# Patient Record
Sex: MALE | Race: OTHER | NOT HISPANIC OR LATINO
[De-identification: names, ages, dates, MRNs, and addresses within clinical notes are randomized per-mention and may not be internally consistent; named-entity substitution may affect disease eponyms.]

---

## 2017-05-30 ENCOUNTER — APPOINTMENT (OUTPATIENT)
Dept: INTERNAL MEDICINE | Facility: CLINIC | Age: 77
End: 2017-05-30

## 2017-06-22 ENCOUNTER — APPOINTMENT (OUTPATIENT)
Dept: INTERNAL MEDICINE | Facility: CLINIC | Age: 77
End: 2017-06-22

## 2017-07-31 ENCOUNTER — APPOINTMENT (OUTPATIENT)
Dept: ORTHOPEDIC SURGERY | Facility: CLINIC | Age: 77
End: 2017-07-31
Payer: MEDICARE

## 2017-07-31 VITALS
HEART RATE: 57 BPM | HEIGHT: 73 IN | DIASTOLIC BLOOD PRESSURE: 63 MMHG | SYSTOLIC BLOOD PRESSURE: 124 MMHG | WEIGHT: 204 LBS | BODY MASS INDEX: 27.04 KG/M2

## 2017-07-31 DIAGNOSIS — M18.0 BILATERAL PRIMARY OSTEOARTHRITIS OF FIRST CARPOMETACARPAL JOINTS: ICD-10-CM

## 2017-07-31 DIAGNOSIS — Z78.9 OTHER SPECIFIED HEALTH STATUS: ICD-10-CM

## 2017-07-31 DIAGNOSIS — M75.51 BURSITIS OF RIGHT SHOULDER: ICD-10-CM

## 2017-07-31 DIAGNOSIS — F17.200 NICOTINE DEPENDENCE, UNSPECIFIED, UNCOMPLICATED: ICD-10-CM

## 2017-07-31 DIAGNOSIS — Z86.39 PERSONAL HISTORY OF OTHER ENDOCRINE, NUTRITIONAL AND METABOLIC DISEASE: ICD-10-CM

## 2017-07-31 DIAGNOSIS — M25.511 PAIN IN RIGHT SHOULDER: ICD-10-CM

## 2017-07-31 PROCEDURE — 73030 X-RAY EXAM OF SHOULDER: CPT | Mod: RT

## 2017-07-31 PROCEDURE — 99203 OFFICE O/P NEW LOW 30 MIN: CPT

## 2018-07-19 ENCOUNTER — RECORD ABSTRACTING (OUTPATIENT)
Age: 78
End: 2018-07-19

## 2018-07-19 DIAGNOSIS — N40.0 BENIGN PROSTATIC HYPERPLASIA WITHOUT LOWER URINARY TRACT SYMPMS: ICD-10-CM

## 2018-07-19 DIAGNOSIS — Z86.39 PERSONAL HISTORY OF OTHER ENDOCRINE, NUTRITIONAL AND METABOLIC DISEASE: ICD-10-CM

## 2018-07-19 DIAGNOSIS — R97.20 BENIGN PROSTATIC HYPERPLASIA WITHOUT LOWER URINARY TRACT SYMPMS: ICD-10-CM

## 2018-07-19 DIAGNOSIS — N28.9 DISORDER OF KIDNEY AND URETER, UNSPECIFIED: ICD-10-CM

## 2018-07-19 DIAGNOSIS — Z82.49 FAMILY HISTORY OF ISCHEMIC HEART DISEASE AND OTHER DISEASES OF THE CIRCULATORY SYSTEM: ICD-10-CM

## 2018-08-07 ENCOUNTER — APPOINTMENT (OUTPATIENT)
Dept: INTERNAL MEDICINE | Facility: CLINIC | Age: 78
End: 2018-08-07
Payer: MEDICARE

## 2018-08-07 VITALS
HEIGHT: 73 IN | DIASTOLIC BLOOD PRESSURE: 59 MMHG | BODY MASS INDEX: 25.45 KG/M2 | SYSTOLIC BLOOD PRESSURE: 128 MMHG | WEIGHT: 192 LBS

## 2018-08-07 PROCEDURE — 99214 OFFICE O/P EST MOD 30 MIN: CPT | Mod: 25

## 2018-08-07 PROCEDURE — 36415 COLL VENOUS BLD VENIPUNCTURE: CPT

## 2018-08-07 RX ORDER — FUROSEMIDE 20 MG/1
20 TABLET ORAL
Qty: 90 | Refills: 0 | Status: DISCONTINUED | COMMUNITY
Start: 2017-07-18 | End: 2018-08-07

## 2018-08-07 NOTE — ASSESSMENT
[FreeTextEntry1] : BPH, to follow with Dr. Nice urology\par \par Chronic kidney disease, review less today. Patient is instructed to avoid nephrotoxic agent.\par \par Hyperlipidemia, continue atorvastatin.\par \par Stable Hypertension, patient currently on hydrochlorothiazide-triamterene, losartan

## 2018-08-07 NOTE — HISTORY OF PRESENT ILLNESS
[Spouse] : spouse [FreeTextEntry1] : follow up on meds [de-identified] : This 78-year-old man who has seen doctors Arnie June 21, 2018. At that time he was noted to have elevated creatinine and calcium levels. Per his note his workup was significant only for a large prostate. And he was following with urology for that.\par \par Patient is due for follow Dr. Nice, urology next week for his enlarged prostate. was detected at the prior Visit\par \par \par Patient has no new complaints.\par \par He feels generally well.

## 2018-08-07 NOTE — PHYSICAL EXAM

## 2018-08-10 LAB
25(OH)D3 SERPL-MCNC: 34.8 NG/ML
ALBUMIN SERPL ELPH-MCNC: 4.5 G/DL
ALP BLD-CCNC: 84 U/L
ALT SERPL-CCNC: 12 U/L
ANION GAP SERPL CALC-SCNC: 13 MMOL/L
AST SERPL-CCNC: 19 U/L
BASOPHILS # BLD AUTO: 0.02 K/UL
BASOPHILS NFR BLD AUTO: 0.2 %
BILIRUB SERPL-MCNC: 0.3 MG/DL
BUN SERPL-MCNC: 31 MG/DL
CALCIUM SERPL-MCNC: 11.6 MG/DL
CHLORIDE SERPL-SCNC: 100 MMOL/L
CHOLEST SERPL-MCNC: 142 MG/DL
CHOLEST/HDLC SERPL: 3.2 RATIO
CO2 SERPL-SCNC: 25 MMOL/L
CREAT SERPL-MCNC: 1.73 MG/DL
EOSINOPHIL # BLD AUTO: 0.17 K/UL
EOSINOPHIL NFR BLD AUTO: 1.9 %
GLUCOSE SERPL-MCNC: 86 MG/DL
HBA1C MFR BLD HPLC: 4.9 %
HCT VFR BLD CALC: 37.5 %
HDLC SERPL-MCNC: 45 MG/DL
HGB BLD-MCNC: 11.9 G/DL
IMM GRANULOCYTES NFR BLD AUTO: 0.2 %
LDLC SERPL CALC-MCNC: 71 MG/DL
LYMPHOCYTES # BLD AUTO: 2.06 K/UL
LYMPHOCYTES NFR BLD AUTO: 22.6 %
MAN DIFF?: NORMAL
MCHC RBC-ENTMCNC: 30.9 PG
MCHC RBC-ENTMCNC: 31.7 GM/DL
MCV RBC AUTO: 97.4 FL
MONOCYTES # BLD AUTO: 0.42 K/UL
MONOCYTES NFR BLD AUTO: 4.6 %
NEUTROPHILS # BLD AUTO: 6.42 K/UL
NEUTROPHILS NFR BLD AUTO: 70.5 %
PLATELET # BLD AUTO: 205 K/UL
POTASSIUM SERPL-SCNC: 4.7 MMOL/L
PROT SERPL-MCNC: 7.5 G/DL
PSA SERPL-MCNC: 7.13 NG/ML
RBC # BLD: 3.85 M/UL
RBC # FLD: 12.7 %
SODIUM SERPL-SCNC: 138 MMOL/L
TRIGL SERPL-MCNC: 132 MG/DL
WBC # FLD AUTO: 9.11 K/UL

## 2018-10-23 ENCOUNTER — LABORATORY RESULT (OUTPATIENT)
Age: 78
End: 2018-10-23

## 2019-02-07 ENCOUNTER — RX RENEWAL (OUTPATIENT)
Age: 79
End: 2019-02-07

## 2019-03-13 ENCOUNTER — RX RENEWAL (OUTPATIENT)
Age: 79
End: 2019-03-13

## 2019-03-28 ENCOUNTER — MEDICATION RENEWAL (OUTPATIENT)
Age: 79
End: 2019-03-28

## 2019-05-14 ENCOUNTER — MEDICATION RENEWAL (OUTPATIENT)
Age: 79
End: 2019-05-14

## 2019-07-12 ENCOUNTER — LABORATORY RESULT (OUTPATIENT)
Age: 79
End: 2019-07-12

## 2019-07-12 ENCOUNTER — APPOINTMENT (OUTPATIENT)
Dept: INTERNAL MEDICINE | Facility: CLINIC | Age: 79
End: 2019-07-12
Payer: MEDICARE

## 2019-07-12 ENCOUNTER — NON-APPOINTMENT (OUTPATIENT)
Age: 79
End: 2019-07-12

## 2019-07-12 VITALS
DIASTOLIC BLOOD PRESSURE: 80 MMHG | HEIGHT: 73 IN | TEMPERATURE: 98.2 F | WEIGHT: 197.5 LBS | BODY MASS INDEX: 26.17 KG/M2 | OXYGEN SATURATION: 98 % | HEART RATE: 64 BPM | SYSTOLIC BLOOD PRESSURE: 132 MMHG

## 2019-07-12 PROCEDURE — G0444 DEPRESSION SCREEN ANNUAL: CPT | Mod: 59

## 2019-07-12 PROCEDURE — 36415 COLL VENOUS BLD VENIPUNCTURE: CPT

## 2019-07-12 PROCEDURE — 99406 BEHAV CHNG SMOKING 3-10 MIN: CPT

## 2019-07-12 PROCEDURE — 99497 ADVNCD CARE PLAN 30 MIN: CPT | Mod: 25

## 2019-07-12 PROCEDURE — 93000 ELECTROCARDIOGRAM COMPLETE: CPT

## 2019-07-12 PROCEDURE — 99214 OFFICE O/P EST MOD 30 MIN: CPT | Mod: 25

## 2019-07-12 PROCEDURE — G0439: CPT

## 2019-07-12 RX ORDER — ASPIRIN ENTERIC COATED TABLETS 81 MG 81 MG/1
81 TABLET, DELAYED RELEASE ORAL DAILY
Refills: 0 | Status: DISCONTINUED | COMMUNITY
End: 2019-07-12

## 2019-07-12 NOTE — HEALTH RISK ASSESSMENT
[Good] : ~his/her~  mood as  good [] : Yes [6-10] : 6-10 [No] : No [0] : 2) Feeling down, depressed, or hopeless: Not at all (0) [Patient declined Low Dose CT Scan] : Patient declined Low Dose CT Scan [Patient declined Retinal Exam] : Patient declined Retinal Exam. [Patient declined mammogram] : Patient declined mammogram [Patient declined PAP Smear] : Patient declined PAP Smear [Patient declined bone density test] : Patient declined bone density test [Patient declined colonoscopy] : Patient declined colonoscopy [HIV test declined] : HIV test declined [Hepatitis C test declined] : Hepatitis C test declined [Reviewed updated] : Reviewed updated [Designated Healthcare Proxy] : Designated healthcare proxy [Name: ___] : Health Care Proxy's Name: [unfilled]  [Relationship: ___] : Relationship: [unfilled] [Aggressive treatment] : aggressive treatment [[id number]] : 0 [de-identified] : Urologist [AdvancecareDate] : 07/19

## 2019-07-12 NOTE — HISTORY OF PRESENT ILLNESS
[FreeTextEntry1] : physical [de-identified] : Mr. DESIRAE OAKLEY is a 79 year male with a PMH of HTN, HLD, comes to the office for physical exam. Patient feels well and has no complaints at this time.

## 2019-07-12 NOTE — PLAN
[FreeTextEntry1] : In regards to patients Physical exam, routine blood work drawn, will review results with patient. EKG reviewed in office \par \par In regards to patients ankle pain, patient was referred to Podiatrist Dr. Cheung.\par \par patient's BP well controlled with current medication. will continue current  regimen \par \par Counseling included abnormal lab results, differential diagnoses, treatment options, risks and benefits, lifestyle changes, prognosis, current condition, medications, and dose adjustments. \par The patient was interactive, attentive, asked questions, and verbalized understanding

## 2019-07-12 NOTE — PHYSICAL EXAM
[No Acute Distress] : no acute distress [Well Nourished] : well nourished [Well-Appearing] : well-appearing [Well Developed] : well developed [Normal Sclera/Conjunctiva] : normal sclera/conjunctiva [EOMI] : extraocular movements intact [PERRL] : pupils equal round and reactive to light [Normal Outer Ear/Nose] : the outer ears and nose were normal in appearance [Normal Oropharynx] : the oropharynx was normal [No JVD] : no jugular venous distention [No Lymphadenopathy] : no lymphadenopathy [Supple] : supple [Thyroid Normal, No Nodules] : the thyroid was normal and there were no nodules present [No Respiratory Distress] : no respiratory distress  [No Accessory Muscle Use] : no accessory muscle use [Clear to Auscultation] : lungs were clear to auscultation bilaterally [Normal Rate] : normal rate  [Regular Rhythm] : with a regular rhythm [Normal S1, S2] : normal S1 and S2 [No Murmur] : no murmur heard [No Carotid Bruits] : no carotid bruits [No Abdominal Bruit] : a ~M bruit was not heard ~T in the abdomen [Pedal Pulses Present] : the pedal pulses are present [No Varicosities] : no varicosities [No Palpable Aorta] : no palpable aorta [No Extremity Clubbing/Cyanosis] : no extremity clubbing/cyanosis [Soft] : abdomen soft [Non Tender] : non-tender [Non-distended] : non-distended [No Masses] : no abdominal mass palpated [No HSM] : no HSM [Normal Bowel Sounds] : normal bowel sounds [Normal Posterior Cervical Nodes] : no posterior cervical lymphadenopathy [Normal Anterior Cervical Nodes] : no anterior cervical lymphadenopathy [No CVA Tenderness] : no CVA  tenderness [No Spinal Tenderness] : no spinal tenderness [No Joint Swelling] : no joint swelling [No Rash] : no rash [Grossly Normal Strength/Tone] : grossly normal strength/tone [Coordination Grossly Intact] : coordination grossly intact [No Focal Deficits] : no focal deficits [Normal Gait] : normal gait [Normal Affect] : the affect was normal [Normal Insight/Judgement] : insight and judgment were intact [de-identified] : +1 pitting edema bilaterally lower extremities.

## 2019-07-15 ENCOUNTER — RX CHANGE (OUTPATIENT)
Age: 79
End: 2019-07-15

## 2019-07-15 LAB
ALBUMIN SERPL ELPH-MCNC: 4.7 G/DL
ALP BLD-CCNC: 70 U/L
ALT SERPL-CCNC: 12 U/L
ANION GAP SERPL CALC-SCNC: 13 MMOL/L
AST SERPL-CCNC: 17 U/L
BASOPHILS # BLD AUTO: 0.03 K/UL
BASOPHILS NFR BLD AUTO: 0.4 %
BILIRUB SERPL-MCNC: 0.4 MG/DL
BUN SERPL-MCNC: 33 MG/DL
CALCIUM SERPL-MCNC: 11 MG/DL
CHLORIDE SERPL-SCNC: 103 MMOL/L
CHOLEST SERPL-MCNC: 131 MG/DL
CHOLEST/HDLC SERPL: 3.6 RATIO
CO2 SERPL-SCNC: 24 MMOL/L
CREAT SERPL-MCNC: 1.99 MG/DL
EOSINOPHIL # BLD AUTO: 0.11 K/UL
EOSINOPHIL NFR BLD AUTO: 1.3 %
ESTIMATED AVERAGE GLUCOSE: 91 MG/DL
GLUCOSE SERPL-MCNC: 97 MG/DL
HBA1C MFR BLD HPLC: 4.8 %
HCT VFR BLD CALC: 38.7 %
HDLC SERPL-MCNC: 36 MG/DL
HGB BLD-MCNC: 11.8 G/DL
IMM GRANULOCYTES NFR BLD AUTO: 0.2 %
LDLC SERPL CALC-MCNC: 66 MG/DL
LYMPHOCYTES # BLD AUTO: 1.51 K/UL
LYMPHOCYTES NFR BLD AUTO: 17.8 %
MAN DIFF?: NORMAL
MCHC RBC-ENTMCNC: 30.5 GM/DL
MCHC RBC-ENTMCNC: 32.1 PG
MCV RBC AUTO: 105.2 FL
MONOCYTES # BLD AUTO: 0.59 K/UL
MONOCYTES NFR BLD AUTO: 7 %
NEUTROPHILS # BLD AUTO: 6.21 K/UL
NEUTROPHILS NFR BLD AUTO: 73.3 %
PLATELET # BLD AUTO: 154 K/UL
POTASSIUM SERPL-SCNC: 4.2 MMOL/L
PROT SERPL-MCNC: 7.1 G/DL
RBC # BLD: 3.68 M/UL
RBC # FLD: 12.2 %
SODIUM SERPL-SCNC: 140 MMOL/L
TRIGL SERPL-MCNC: 147 MG/DL
TSH SERPL-ACNC: 1.58 UIU/ML
WBC # FLD AUTO: 8.47 K/UL

## 2019-08-26 ENCOUNTER — APPOINTMENT (OUTPATIENT)
Dept: NEPHROLOGY | Facility: CLINIC | Age: 79
End: 2019-08-26

## 2019-09-23 ENCOUNTER — RX RENEWAL (OUTPATIENT)
Age: 79
End: 2019-09-23

## 2019-10-02 ENCOUNTER — MEDICATION RENEWAL (OUTPATIENT)
Age: 79
End: 2019-10-02

## 2019-10-07 ENCOUNTER — RX RENEWAL (OUTPATIENT)
Age: 79
End: 2019-10-07

## 2019-12-02 ENCOUNTER — RX RENEWAL (OUTPATIENT)
Age: 79
End: 2019-12-02

## 2019-12-04 ENCOUNTER — MEDICATION RENEWAL (OUTPATIENT)
Age: 79
End: 2019-12-04

## 2019-12-05 ENCOUNTER — RX RENEWAL (OUTPATIENT)
Age: 79
End: 2019-12-05

## 2019-12-17 ENCOUNTER — RX RENEWAL (OUTPATIENT)
Age: 79
End: 2019-12-17

## 2020-01-06 ENCOUNTER — RX RENEWAL (OUTPATIENT)
Age: 80
End: 2020-01-06

## 2020-01-24 ENCOUNTER — RX RENEWAL (OUTPATIENT)
Age: 80
End: 2020-01-24

## 2020-02-27 ENCOUNTER — RX RENEWAL (OUTPATIENT)
Age: 80
End: 2020-02-27

## 2020-03-30 ENCOUNTER — RX RENEWAL (OUTPATIENT)
Age: 80
End: 2020-03-30

## 2020-04-01 ENCOUNTER — RX RENEWAL (OUTPATIENT)
Age: 80
End: 2020-04-01

## 2020-05-31 ENCOUNTER — RX RENEWAL (OUTPATIENT)
Age: 80
End: 2020-05-31

## 2020-06-03 ENCOUNTER — RX RENEWAL (OUTPATIENT)
Age: 80
End: 2020-06-03

## 2020-06-04 ENCOUNTER — APPOINTMENT (OUTPATIENT)
Dept: INTERNAL MEDICINE | Facility: CLINIC | Age: 80
End: 2020-06-04
Payer: MEDICARE

## 2020-06-04 VITALS
BODY MASS INDEX: 25.73 KG/M2 | SYSTOLIC BLOOD PRESSURE: 128 MMHG | WEIGHT: 195 LBS | DIASTOLIC BLOOD PRESSURE: 70 MMHG | OXYGEN SATURATION: 96 % | HEART RATE: 78 BPM | RESPIRATION RATE: 14 BRPM | TEMPERATURE: 98 F

## 2020-06-04 DIAGNOSIS — Z20.828 CONTACT WITH AND (SUSPECTED) EXPOSURE TO OTHER VIRAL COMMUNICABLE DISEASES: ICD-10-CM

## 2020-06-04 DIAGNOSIS — M25.562 PAIN IN RIGHT KNEE: ICD-10-CM

## 2020-06-04 DIAGNOSIS — M25.561 PAIN IN RIGHT KNEE: ICD-10-CM

## 2020-06-04 PROCEDURE — 36415 COLL VENOUS BLD VENIPUNCTURE: CPT

## 2020-06-04 PROCEDURE — 99214 OFFICE O/P EST MOD 30 MIN: CPT | Mod: 25

## 2020-06-04 NOTE — HISTORY OF PRESENT ILLNESS
[de-identified] : Mr. DESIRAE OAKLEY is a 79 year male with a PMH of HTN, HLD, comes to the office for follow up of chronic medical conditions and bilateral knee pain x 3 weeks getting progressively worse 7/10 non-radiating worse with movement relieved with rest.  [FreeTextEntry1] : Follow up chronic medical conditions and bilateral knee pain

## 2020-06-04 NOTE — PHYSICAL EXAM
[No Acute Distress] : no acute distress [Well Nourished] : well nourished [Well Developed] : well developed [Well-Appearing] : well-appearing [PERRL] : pupils equal round and reactive to light [Normal Sclera/Conjunctiva] : normal sclera/conjunctiva [EOMI] : extraocular movements intact [Normal Outer Ear/Nose] : the outer ears and nose were normal in appearance [Normal Oropharynx] : the oropharynx was normal [No Lymphadenopathy] : no lymphadenopathy [No JVD] : no jugular venous distention [Supple] : supple [Thyroid Normal, No Nodules] : the thyroid was normal and there were no nodules present [No Respiratory Distress] : no respiratory distress  [No Accessory Muscle Use] : no accessory muscle use [Clear to Auscultation] : lungs were clear to auscultation bilaterally [Regular Rhythm] : with a regular rhythm [Normal Rate] : normal rate  [Normal S1, S2] : normal S1 and S2 [No Murmur] : no murmur heard [No Carotid Bruits] : no carotid bruits [No Abdominal Bruit] : a ~M bruit was not heard ~T in the abdomen [Pedal Pulses Present] : the pedal pulses are present [No Varicosities] : no varicosities [No Palpable Aorta] : no palpable aorta [No Extremity Clubbing/Cyanosis] : no extremity clubbing/cyanosis [Soft] : abdomen soft [Non Tender] : non-tender [Non-distended] : non-distended [No Masses] : no abdominal mass palpated [No HSM] : no HSM [Normal Bowel Sounds] : normal bowel sounds [Normal Posterior Cervical Nodes] : no posterior cervical lymphadenopathy [No CVA Tenderness] : no CVA  tenderness [Normal Anterior Cervical Nodes] : no anterior cervical lymphadenopathy [No Spinal Tenderness] : no spinal tenderness [Grossly Normal Strength/Tone] : grossly normal strength/tone [No Rash] : no rash [Coordination Grossly Intact] : coordination grossly intact [No Focal Deficits] : no focal deficits [Normal Gait] : normal gait [Normal Insight/Judgement] : insight and judgment were intact [Normal Affect] : the affect was normal [de-identified] : +1 pitting edema bilaterally lower extremities.  [de-identified] : patient with palpation of lateral aspect of both knees

## 2020-06-04 NOTE — PLAN
[FreeTextEntry1] : In regards to patient's knee pain, will prescribe Voltaren gel to apply to area Q6 PRN.\par Will obtain X-ray's of knees bilaterally and call patient with results\par Will refer to Orthopedic surgeon for further evaluation and management. \par \par In regards to patient's history of anemia, will repeat CBC today and call patient with results\par \par In regards to patient's history of CKD, will test BUN/Cr and call patient with results\par \par Counseling included abnormal lab results, differential diagnoses, treatment options, risks and benefits, lifestyle changes, current condition, medications, and dose adjustments. \par The patient was interactive, attentive, asked questions, and verbalized understanding

## 2020-06-05 LAB
ALBUMIN SERPL ELPH-MCNC: 4.5 G/DL
ALP BLD-CCNC: 64 U/L
ALT SERPL-CCNC: 15 U/L
ANION GAP SERPL CALC-SCNC: 13 MMOL/L
AST SERPL-CCNC: 19 U/L
BASOPHILS # BLD AUTO: 0.04 K/UL
BASOPHILS NFR BLD AUTO: 0.6 %
BILIRUB SERPL-MCNC: 0.5 MG/DL
BUN SERPL-MCNC: 37 MG/DL
CALCIUM SERPL-MCNC: 11.4 MG/DL
CHLORIDE SERPL-SCNC: 102 MMOL/L
CO2 SERPL-SCNC: 24 MMOL/L
CREAT SERPL-MCNC: 2.1 MG/DL
EOSINOPHIL # BLD AUTO: 0.11 K/UL
EOSINOPHIL NFR BLD AUTO: 1.6 %
GLUCOSE SERPL-MCNC: 102 MG/DL
HCT VFR BLD CALC: 37.4 %
HGB BLD-MCNC: 11.6 G/DL
IMM GRANULOCYTES NFR BLD AUTO: 0.1 %
LYMPHOCYTES # BLD AUTO: 1.77 K/UL
LYMPHOCYTES NFR BLD AUTO: 25.1 %
MAN DIFF?: NORMAL
MCHC RBC-ENTMCNC: 31 GM/DL
MCHC RBC-ENTMCNC: 31.7 PG
MCV RBC AUTO: 102.2 FL
MONOCYTES # BLD AUTO: 0.6 K/UL
MONOCYTES NFR BLD AUTO: 8.5 %
NEUTROPHILS # BLD AUTO: 4.52 K/UL
NEUTROPHILS NFR BLD AUTO: 64.1 %
PLATELET # BLD AUTO: 219 K/UL
POTASSIUM SERPL-SCNC: 4.6 MMOL/L
PROT SERPL-MCNC: 7.1 G/DL
RBC # BLD: 3.66 M/UL
RBC # FLD: 12.1 %
SARS-COV-2 IGG SERPL IA-ACNC: <0.1 INDEX
SARS-COV-2 IGG SERPL QL IA: NEGATIVE
SODIUM SERPL-SCNC: 138 MMOL/L
WBC # FLD AUTO: 7.05 K/UL

## 2020-06-19 ENCOUNTER — APPOINTMENT (OUTPATIENT)
Dept: ORTHOPEDIC SURGERY | Facility: CLINIC | Age: 80
End: 2020-06-19
Payer: MEDICARE

## 2020-06-19 VITALS
WEIGHT: 195 LBS | DIASTOLIC BLOOD PRESSURE: 70 MMHG | SYSTOLIC BLOOD PRESSURE: 152 MMHG | HEART RATE: 71 BPM | BODY MASS INDEX: 25.84 KG/M2 | HEIGHT: 73 IN

## 2020-06-19 DIAGNOSIS — M17.0 BILATERAL PRIMARY OSTEOARTHRITIS OF KNEE: ICD-10-CM

## 2020-06-19 PROCEDURE — 73564 X-RAY EXAM KNEE 4 OR MORE: CPT | Mod: 50

## 2020-06-19 PROCEDURE — 99214 OFFICE O/P EST MOD 30 MIN: CPT

## 2020-06-19 NOTE — ADDENDUM
[FreeTextEntry1] : This note was authored by Lambert Milner working as a medical scribe for Dr. Abdoulaye Law. The note was reviewed, edited, and revised by Dr. Abdoulaye Law whom is in agreement with the exam findings, imaging findings, and treatment plan. 06/19/2020.

## 2020-06-19 NOTE — CONSULT LETTER
[Dear  ___] : Dear  [unfilled], [Please see my note below.] : Please see my note below. [Consult Letter:] : I had the pleasure of evaluating your patient, [unfilled]. [Consult Closing:] : Thank you very much for allowing me to participate in the care of this patient.  If you have any questions, please do not hesitate to contact me. [Sincerely,] : Sincerely, [FreeTextEntry3] : Abdoulaye Law MD\par Chief of Joint Replacement\par [facility] \par [facility]\par \par  [FreeTextEntry2] : GONZALO ADRIAN MD\par

## 2020-06-19 NOTE — PHYSICAL EXAM
[de-identified] : The patient appears well nourished  and in no apparent distress.  The patient is alert and oriented to person, place, and time.   Affect and mood appear normal. The head is normocephalic and atraumatic.  The eyes reveal normal sclera and extra ocular muscles are intact. The tongue is midline with no apparent lesions.  Skin shows normal turgor with no evidence of eczema or psoriasis.  No respiratory distress noted.  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 [de-identified] : Exam of the left knee shows a small effusion, -3 to 115 degrees of flexion.\par Exam of the right knee shows a minimal effusion, full extension, flexion of 125 degrees. 5/5 motor strength bilaterally distally. Sensation intact distally.

## 2020-06-19 NOTE — DISCUSSION/SUMMARY
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  He developed a severe arthritic flare which has subsided by about 50%.  Conservative options were discussed. He was given a prescription for physical therapy. We discussed injections in the future if pain progresses.  He was not interested in any injections today.  He reports renal insufficiency so we opted not to prescribe anti-inflammatories until he speaks with his primary care doctor about this.  He may follow up as needed.

## 2020-06-19 NOTE — HISTORY OF PRESENT ILLNESS
[de-identified] : The patient is a 80 year old male being seen for evaluation of his bilateral knees.  Patient has had bilateral knee pain for approximately 1 month.  His knee pain is localized to the lateral aspect of the bilateral knees.  His pain comes and goes and is not necessarily brought on by physical activity. He has not had physical therapy and has not had injections to the knees. Patient takes Tylenol as needed for pain but states it does not really work well. He states he is otherwise healthy without any medical issues going on.

## 2020-06-22 ENCOUNTER — RX RENEWAL (OUTPATIENT)
Age: 80
End: 2020-06-22

## 2020-06-26 ENCOUNTER — RX RENEWAL (OUTPATIENT)
Age: 80
End: 2020-06-26

## 2020-07-14 ENCOUNTER — APPOINTMENT (OUTPATIENT)
Dept: NEPHROLOGY | Facility: CLINIC | Age: 80
End: 2020-07-14

## 2020-08-19 ENCOUNTER — APPOINTMENT (OUTPATIENT)
Dept: INTERNAL MEDICINE | Facility: CLINIC | Age: 80
End: 2020-08-19
Payer: MEDICARE

## 2020-08-19 VITALS
SYSTOLIC BLOOD PRESSURE: 129 MMHG | HEIGHT: 73 IN | DIASTOLIC BLOOD PRESSURE: 65 MMHG | BODY MASS INDEX: 25.86 KG/M2 | WEIGHT: 195.13 LBS | TEMPERATURE: 97.4 F

## 2020-08-19 PROCEDURE — 99406 BEHAV CHNG SMOKING 3-10 MIN: CPT

## 2020-08-19 PROCEDURE — 36415 COLL VENOUS BLD VENIPUNCTURE: CPT

## 2020-08-19 PROCEDURE — 99214 OFFICE O/P EST MOD 30 MIN: CPT | Mod: 25

## 2020-08-19 NOTE — COUNSELING
[Risk of tobacco use and health benefits of smoking cessation discussed] : Risk of tobacco use and health benefits of smoking cessation discussed [Cessation strategies including cessation program discussed] : Cessation strategies including cessation program discussed [Use of nicotine replacement therapies and other medications discussed] : Use of nicotine replacement therapies and other medications discussed [Encouraged to pick a quit date and identify support needed to quit] : Encouraged to pick a quit date and identify support needed to quit [Willing to Quit Smoking] : Not willing to quit smoking [Tobacco Use Cessation Intermediate Greater Than 3 Minutes Up to 10 Minutes] : Tobacco Use Cessation Intermediate Greater Than 3 Minutes Up to 10 Minutes [FreeTextEntry1] : 3

## 2020-08-19 NOTE — PLAN
[FreeTextEntry1] : \par In regards to patient's history of anemia, will repeat CBC today and call patient with results\par \par In regards to patient's history of CKD, will test BUN/Cr and call patient with results\par \par Patient not ready to quit smoking yet\par \par Counseling included abnormal lab results, differential diagnoses, treatment options, risks and benefits, lifestyle changes, current condition, medications, and dose adjustments. \par The patient was interactive, attentive, asked questions, and verbalized understanding

## 2020-08-19 NOTE — PHYSICAL EXAM
[Well Nourished] : well nourished [No Acute Distress] : no acute distress [Normal Sclera/Conjunctiva] : normal sclera/conjunctiva [Well Developed] : well developed [Well-Appearing] : well-appearing [EOMI] : extraocular movements intact [PERRL] : pupils equal round and reactive to light [Normal Outer Ear/Nose] : the outer ears and nose were normal in appearance [No Lymphadenopathy] : no lymphadenopathy [Normal Oropharynx] : the oropharynx was normal [No JVD] : no jugular venous distention [Thyroid Normal, No Nodules] : the thyroid was normal and there were no nodules present [Supple] : supple [No Respiratory Distress] : no respiratory distress  [Normal Rate] : normal rate  [No Accessory Muscle Use] : no accessory muscle use [Clear to Auscultation] : lungs were clear to auscultation bilaterally [No Murmur] : no murmur heard [Normal S1, S2] : normal S1 and S2 [Regular Rhythm] : with a regular rhythm [No Varicosities] : no varicosities [No Carotid Bruits] : no carotid bruits [No Abdominal Bruit] : a ~M bruit was not heard ~T in the abdomen [Pedal Pulses Present] : the pedal pulses are present [No Edema] : there was no peripheral edema [No Palpable Aorta] : no palpable aorta [No Extremity Clubbing/Cyanosis] : no extremity clubbing/cyanosis [Soft] : abdomen soft [Non-distended] : non-distended [Non Tender] : non-tender [No Masses] : no abdominal mass palpated [No HSM] : no HSM [Normal Posterior Cervical Nodes] : no posterior cervical lymphadenopathy [Normal Bowel Sounds] : normal bowel sounds [Normal Anterior Cervical Nodes] : no anterior cervical lymphadenopathy [No CVA Tenderness] : no CVA  tenderness [No Spinal Tenderness] : no spinal tenderness [No Joint Swelling] : no joint swelling [Grossly Normal Strength/Tone] : grossly normal strength/tone [No Rash] : no rash [Coordination Grossly Intact] : coordination grossly intact [No Focal Deficits] : no focal deficits [Normal Gait] : normal gait [Normal Affect] : the affect was normal [Normal Insight/Judgement] : insight and judgment were intact [de-identified] : +1 pitting edema bilaterally lower extremities.

## 2020-08-19 NOTE — HEALTH RISK ASSESSMENT
[] : Yes [30 or more] : 30 or more [0] : 1) Little interest or pleasure doing things: Not at all (0) [[id number]] : 0

## 2020-08-19 NOTE — HISTORY OF PRESENT ILLNESS
[FreeTextEntry1] : Follow up chronic medical conditions and bilateral knee pain [de-identified] : Mr. DESIRAE OAKLEY is a 80 year male with a PMH of HTN, HLD, comes to the office for follow up of chronic medical conditions Patient feels well and has no complaints at this time.

## 2020-08-20 LAB
ALBUMIN SERPL ELPH-MCNC: 4.8 G/DL
ALP BLD-CCNC: 66 U/L
ALT SERPL-CCNC: 14 U/L
ANION GAP SERPL CALC-SCNC: 10 MMOL/L
AST SERPL-CCNC: 20 U/L
BASOPHILS # BLD AUTO: 0.02 K/UL
BASOPHILS NFR BLD AUTO: 0.3 %
BILIRUB SERPL-MCNC: 0.4 MG/DL
BUN SERPL-MCNC: 37 MG/DL
CALCIUM SERPL-MCNC: 11.6 MG/DL
CHLORIDE SERPL-SCNC: 100 MMOL/L
CHOLEST SERPL-MCNC: 136 MG/DL
CHOLEST/HDLC SERPL: 3.3 RATIO
CO2 SERPL-SCNC: 27 MMOL/L
CREAT SERPL-MCNC: 2.14 MG/DL
EOSINOPHIL # BLD AUTO: 0.1 K/UL
EOSINOPHIL NFR BLD AUTO: 1.6 %
ESTIMATED AVERAGE GLUCOSE: 94 MG/DL
FERRITIN SERPL-MCNC: 217 NG/ML
FOLATE SERPL-MCNC: 6.3 NG/ML
GLUCOSE SERPL-MCNC: 84 MG/DL
HBA1C MFR BLD HPLC: 4.9 %
HCT VFR BLD CALC: 35.9 %
HDLC SERPL-MCNC: 41 MG/DL
HGB BLD-MCNC: 11.4 G/DL
IMM GRANULOCYTES NFR BLD AUTO: 0.2 %
IRON SATN MFR SERPL: 38 %
IRON SERPL-MCNC: 81 UG/DL
LDLC SERPL CALC-MCNC: 57 MG/DL
LYMPHOCYTES # BLD AUTO: 1.57 K/UL
LYMPHOCYTES NFR BLD AUTO: 24.8 %
MAN DIFF?: NORMAL
MCHC RBC-ENTMCNC: 31.8 GM/DL
MCHC RBC-ENTMCNC: 32.5 PG
MCV RBC AUTO: 102.3 FL
MONOCYTES # BLD AUTO: 0.52 K/UL
MONOCYTES NFR BLD AUTO: 8.2 %
NEUTROPHILS # BLD AUTO: 4.1 K/UL
NEUTROPHILS NFR BLD AUTO: 64.9 %
PLATELET # BLD AUTO: 196 K/UL
POTASSIUM SERPL-SCNC: 4.5 MMOL/L
PROT SERPL-MCNC: 7 G/DL
PSA SERPL-MCNC: 9.66 NG/ML
RBC # BLD: 3.51 M/UL
RBC # FLD: 12 %
SODIUM SERPL-SCNC: 138 MMOL/L
TIBC SERPL-MCNC: 211 UG/DL
TRANSFERRIN SERPL-MCNC: 166 MG/DL
TRIGL SERPL-MCNC: 190 MG/DL
TSH SERPL-ACNC: 1.46 UIU/ML
UIBC SERPL-MCNC: 131 UG/DL
VIT B12 SERPL-MCNC: 273 PG/ML
WBC # FLD AUTO: 6.32 K/UL

## 2020-09-29 ENCOUNTER — RX RENEWAL (OUTPATIENT)
Age: 80
End: 2020-09-29

## 2020-10-23 ENCOUNTER — APPOINTMENT (OUTPATIENT)
Dept: NEPHROLOGY | Facility: CLINIC | Age: 80
End: 2020-10-23
Payer: MEDICARE

## 2020-10-23 ENCOUNTER — LABORATORY RESULT (OUTPATIENT)
Age: 80
End: 2020-10-23

## 2020-10-23 VITALS
BODY MASS INDEX: 25.84 KG/M2 | SYSTOLIC BLOOD PRESSURE: 140 MMHG | TEMPERATURE: 97.9 F | HEIGHT: 73 IN | DIASTOLIC BLOOD PRESSURE: 68 MMHG | WEIGHT: 195 LBS | HEART RATE: 78 BPM

## 2020-10-23 PROCEDURE — 36415 COLL VENOUS BLD VENIPUNCTURE: CPT

## 2020-10-23 PROCEDURE — 99205 OFFICE O/P NEW HI 60 MIN: CPT | Mod: 25

## 2020-10-23 RX ORDER — TRIAMTERENE AND HYDROCHLOROTHIAZIDE 37.5; 25 MG/1; MG/1
37.5-25 CAPSULE ORAL
Qty: 90 | Refills: 2 | Status: DISCONTINUED | COMMUNITY
Start: 2017-02-03 | End: 2020-10-23

## 2020-10-23 RX ORDER — DICLOFENAC SODIUM 10 MG/G
1 GEL TOPICAL DAILY
Qty: 100 | Refills: 0 | Status: DISCONTINUED | COMMUNITY
Start: 2020-06-04 | End: 2020-10-23

## 2020-10-23 RX ORDER — TRIAMTERENE AND HYDROCHLOROTHIAZIDE 37.5; 25 MG/1; MG/1
37.5-25 CAPSULE ORAL
Qty: 30 | Refills: 1 | Status: DISCONTINUED | COMMUNITY
Start: 2019-12-05 | End: 2020-10-23

## 2020-10-23 NOTE — PHYSICAL EXAM
[General Appearance - Alert] : alert [General Appearance - In No Acute Distress] : in no acute distress [Sclera] : the sclera and conjunctiva were normal [PERRL With Normal Accommodation] : pupils were equal in size, round, and reactive to light [Extraocular Movements] : extraocular movements were intact [Outer Ear] : the ears and nose were normal in appearance [Oropharynx] : the oropharynx was normal [Neck Appearance] : the appearance of the neck was normal [Neck Cervical Mass (___cm)] : no neck mass was observed [Jugular Venous Distention Increased] : there was no jugular-venous distention [Thyroid Diffuse Enlargement] : the thyroid was not enlarged [Thyroid Nodule] : there were no palpable thyroid nodules [Auscultation Breath Sounds / Voice Sounds] : lungs were clear to auscultation bilaterally [Heart Rate And Rhythm] : heart rate was normal and rhythm regular [Heart Sounds] : normal S1 and S2 [Heart Sounds Gallop] : no gallops [Murmurs] : no murmurs [Heart Sounds Pericardial Friction Rub] : no pericardial rub [Full Pulse] : the pedal pulses are present [Pitting Edema] : pitting edema present [___ +] : bilateral [unfilled]+ pitting edema to the ankles [FreeTextEntry1] : Known to have Chronic Leg edema,  [Bowel Sounds] : normal bowel sounds [Abdomen Soft] : soft [Abdomen Tenderness] : non-tender [Abdomen Mass (___ Cm)] : no abdominal mass palpated [Cervical Lymph Nodes Enlarged Posterior Bilaterally] : posterior cervical [Cervical Lymph Nodes Enlarged Anterior Bilaterally] : anterior cervical [Supraclavicular Lymph Nodes Enlarged Bilaterally] : supraclavicular [Axillary Lymph Nodes Enlarged Bilaterally] : axillary [Femoral Lymph Nodes Enlarged Bilaterally] : femoral [Inguinal Lymph Nodes Enlarged Bilaterally] : inguinal [No CVA Tenderness] : no ~M costovertebral angle tenderness [No Spinal Tenderness] : no spinal tenderness [Abnormal Walk] : normal gait [Nail Clubbing] : no clubbing  or cyanosis of the fingernails [Musculoskeletal - Swelling] : no joint swelling seen [Motor Tone] : muscle strength and tone were normal [Skin Color & Pigmentation] : normal skin color and pigmentation [Skin Turgor] : normal skin turgor [] : no rash [Deep Tendon Reflexes (DTR)] : deep tendon reflexes were 2+ and symmetric [Sensation] : the sensory exam was normal to light touch and pinprick [No Focal Deficits] : no focal deficits [Oriented To Time, Place, And Person] : oriented to person, place, and time [Impaired Insight] : insight and judgment were intact [Affect] : the affect was normal

## 2020-10-23 NOTE — ASSESSMENT
[FreeTextEntry1] : CKD - 3,\par \par Anemia, Hypercalcemia\par \par R : [facility] For Myeloma,\par \par D/C Thiazide, \par \par CKD [facility],

## 2020-10-26 DIAGNOSIS — M19.011 PRIMARY OSTEOARTHRITIS, RIGHT SHOULDER: ICD-10-CM

## 2020-10-26 LAB
25(OH)D3 SERPL-MCNC: 36.2 NG/ML
ALBUMIN SERPL ELPH-MCNC: 4.8 G/DL
ANION GAP SERPL CALC-SCNC: 13 MMOL/L
APPEARANCE: CLEAR
BACTERIA: ABNORMAL
BASOPHILS # BLD AUTO: 0 K/UL
BASOPHILS NFR BLD AUTO: 0 %
BILIRUBIN URINE: NEGATIVE
BLOOD URINE: NEGATIVE
BUN SERPL-MCNC: 38 MG/DL
CALCIUM SERPL-MCNC: 11.5 MG/DL
CALCIUM SERPL-MCNC: 11.5 MG/DL
CHLORIDE SERPL-SCNC: 104 MMOL/L
CO2 SERPL-SCNC: 23 MMOL/L
COLOR: YELLOW
CREAT 24H UR-MCNC: NORMAL G/24 H
CREAT ?TM UR-MCNC: 93 MG/DL
CREAT ?TM UR-MCNC: 93 MG/DL
CREAT SERPL-MCNC: 2.1 MG/DL
CREAT SPEC-SCNC: 93 MG/DL
CREAT/PROT UR: 0.1 RATIO
DEPRECATED KAPPA LC FREE/LAMBDA SER: 3.61 RATIO
EOSINOPHIL # BLD AUTO: 0 K/UL
EOSINOPHIL NFR BLD AUTO: 0 %
GLUCOSE QUALITATIVE U: NEGATIVE
GLUCOSE SERPL-MCNC: 106 MG/DL
HCT VFR BLD CALC: 39.4 %
HGB BLD-MCNC: 11.9 G/DL
HYALINE CASTS: 3 /LPF
KAPPA LC CSF-MCNC: 2.13 MG/DL
KAPPA LC SERPL-MCNC: 7.68 MG/DL
KETONES URINE: NEGATIVE
LEUKOCYTE ESTERASE URINE: ABNORMAL
LYMPHOCYTES # BLD AUTO: 1.58 K/UL
LYMPHOCYTES NFR BLD AUTO: 18.6 %
MAN DIFF?: NORMAL
MCHC RBC-ENTMCNC: 30.2 GM/DL
MCHC RBC-ENTMCNC: 31.8 PG
MCV RBC AUTO: 105.3 FL
MICROSCOPIC-UA: NORMAL
MONOCYTES # BLD AUTO: 0.82 K/UL
MONOCYTES NFR BLD AUTO: 9.7 %
NEUTROPHILS # BLD AUTO: 6.09 K/UL
NEUTROPHILS NFR BLD AUTO: 71.7 %
NITRITE URINE: NEGATIVE
PARATHYROID HORMONE INTACT: 129 PG/ML
PH URINE: 6
PHOSPHATE SERPL-MCNC: 1.8 MG/DL
PLATELET # BLD AUTO: 194 K/UL
POTASSIUM SERPL-SCNC: 4.5 MMOL/L
PROT 24H UR-MRATE: 9 MG/DL
PROT ?TM UR-MCNC: NORMAL
PROT UR-MCNC: 8 MG/DL
PROT UR-MCNC: NORMAL
PROTEIN URINE: NEGATIVE
RBC # BLD: 3.74 M/UL
RBC # FLD: 12 %
RED BLOOD CELLS URINE: 0 /HPF
SODIUM SERPL-SCNC: 140 MMOL/L
SPECIFIC GRAVITY URINE: 1.01
SPECIMEN VOL 24H UR: NORMAL
SQUAMOUS EPITHELIAL CELLS: 0 /HPF
UROBILINOGEN URINE: NORMAL
WBC # FLD AUTO: 8.5 K/UL
WHITE BLOOD CELLS URINE: 31 /HPF

## 2020-10-27 LAB
ALBUMIN MFR SERPL ELPH: 61.1 %
ALBUMIN SERPL-MCNC: 4.5 G/DL
ALBUMIN/GLOB SERPL: 1.6 RATIO
ALBUPE: 31.6 %
ALPHA1 GLOB MFR SERPL ELPH: 3.5 %
ALPHA1 GLOB SERPL ELPH-MCNC: 0.3 G/DL
ALPHA1UPE: 23.4 %
ALPHA2 GLOB MFR SERPL ELPH: 9.7 %
ALPHA2 GLOB SERPL ELPH-MCNC: 0.7 G/DL
ALPHA2UPE: 13.4 %
B-GLOBULIN MFR SERPL ELPH: 8.9 %
B-GLOBULIN SERPL ELPH-MCNC: 0.6 G/DL
BETAUPE: 7.7 %
CREAT 24H UR-MCNC: NORMAL G/24 H
CREATININE UR (MAYO): 93 MG/DL
DEPRECATED KAPPA LC FREE/LAMBDA SER: 3.61 RATIO
GAMMA GLOB FLD ELPH-MCNC: 1.2 G/DL
GAMMA GLOB MFR SERPL ELPH: 16.8 %
GAMMAUPE: 23.9 %
IGA 24H UR QL IFE: NORMAL
IGA SER QL IEP: 130 MG/DL
IGG SER QL IEP: 1338 MG/DL
IGM SER QL IEP: 64 MG/DL
INTERPRETATION SERPL IEP-IMP: NORMAL
KAPPA LC 24H UR QL: NORMAL
KAPPA LC CSF-MCNC: 2.13 MG/DL
KAPPA LC SERPL-MCNC: 7.68 MG/DL
M PROTEIN SPEC IFE-MCNC: NORMAL
PROT PATTERN 24H UR ELPH-IMP: NORMAL
PROT SERPL-MCNC: 7.3 G/DL
PROT SERPL-MCNC: 7.3 G/DL
PROT UR-MCNC: 9 MG/DL
PROT UR-MCNC: 9 MG/DL
SPECIMEN VOL 24H UR: NORMAL ML

## 2020-10-30 ENCOUNTER — RX RENEWAL (OUTPATIENT)
Age: 80
End: 2020-10-30

## 2020-11-06 DIAGNOSIS — Z23 ENCOUNTER FOR IMMUNIZATION: ICD-10-CM

## 2020-11-16 ENCOUNTER — RX RENEWAL (OUTPATIENT)
Age: 80
End: 2020-11-16

## 2021-01-22 ENCOUNTER — RX RENEWAL (OUTPATIENT)
Age: 81
End: 2021-01-22

## 2021-01-22 ENCOUNTER — APPOINTMENT (OUTPATIENT)
Dept: FAMILY MEDICINE | Facility: CLINIC | Age: 81
End: 2021-01-22
Payer: MEDICARE

## 2021-01-22 ENCOUNTER — NON-APPOINTMENT (OUTPATIENT)
Age: 81
End: 2021-01-22

## 2021-01-22 VITALS
BODY MASS INDEX: 25.84 KG/M2 | SYSTOLIC BLOOD PRESSURE: 149 MMHG | DIASTOLIC BLOOD PRESSURE: 59 MMHG | HEART RATE: 84 BPM | WEIGHT: 195 LBS | RESPIRATION RATE: 16 BRPM | TEMPERATURE: 97.6 F | HEIGHT: 73 IN

## 2021-01-22 PROCEDURE — 93000 ELECTROCARDIOGRAM COMPLETE: CPT

## 2021-01-22 PROCEDURE — 99214 OFFICE O/P EST MOD 30 MIN: CPT | Mod: 25

## 2021-01-22 PROCEDURE — 36415 COLL VENOUS BLD VENIPUNCTURE: CPT

## 2021-01-22 NOTE — ASSESSMENT
[FreeTextEntry1] : \par Unclear cause, can't rule out electrolyte imbalance, dehydration since this patient was not drinking enough water. But can't fully rule out a TIA. Symptoms resolved \par Will check for electrolytes imbalances\par EKG reviewed: no acute ST abnormalities, patient without cardiac symptoms, but changed from different EKG. Referring to cardiology, patient will make appointment with Dr Haley\par Ordering Head CT\par Referring to neurology\par Checkng lipids, A1C, CBC, CMP, TFTs\par ADvised to increase his oral hydration\par Alarm symptoms discussed  including but not limited to altered mental status, weakness, facial dopiness, alteration to sensation, chest pain, palpitations. shortness of breath, visual disturbances, and I advised the patient to go to the emergency department if this symptoms appear. \par Return to care: within 1 month or earlier if needed\par Call or return for any questions

## 2021-01-22 NOTE — HISTORY OF PRESENT ILLNESS
[FreeTextEntry8] : Mr. DESIRAE OAKLEY is a 80 year old male with PMH of BPH following up with urology, HTN, HLD who comes in to the office for an episode of amnesia. As per patient's wife she on jan/12/2021 when she went back home he was acting strange, he was sitting in the couch and was not properly answering his questions. He stood up but seemed unsteady, went to the bathroom, then he undressed in the living room saying that he was going to take a shower. THe wife sent him to bed. The day after he was not stumbling or unsteady but not still at his baseline. Patient denies that was taking new medications, denies fever, pain or other symptoms,. reports that was eating but not drinking enough water. \par As per patient's wife, he is back to normal, just a little quiet, he talks on the phone, uses the computer, denies weakness, facial dopiness, alteration to sensation, chest pain, palpitations. shortness of breath, visual disturbances or other complaints\par Patient can't recall what happened during the first day.He went to the urologist 3 days ago.

## 2021-01-22 NOTE — PHYSICAL EXAM
[Coordination Grossly Intact] : coordination grossly intact [No Focal Deficits] : no focal deficits [Normal Gait] : normal gait [Cranial Nerves Oculomotor (III)] : the extraocular motions were intact [Cranial Nerves Trigeminal (V)] : sensation to the face and masseter strength were intact [Cranial Nerves Facial (VII)] : facial strength was intact bilaterally [Cranial Nerves Vestibulocochlear (VIII)] : hearing was intact [Cranial Nerves Glossopharyngeal (IX)] : there was normal movement of the soft palate and normal gag [Cranial Nerves Accessory (XI - Cranial And Spinal)] : shoulder shrug was intact bilaterally [Sensation Tactile Decrease] : light touch was intact [2+] : left 2+ [Speech Grossly Normal] : speech grossly normal [Alert and Oriented x3] : oriented to person, place, and time [Normal Mood] : the mood was normal [Normal] : affect was normal and insight and judgment were intact [de-identified] : no ystagmus

## 2021-01-26 LAB
ALBUMIN SERPL ELPH-MCNC: 4 G/DL
ALP BLD-CCNC: 66 U/L
ALT SERPL-CCNC: 17 U/L
ANION GAP SERPL CALC-SCNC: 9 MMOL/L
AST SERPL-CCNC: 16 U/L
BILIRUB SERPL-MCNC: 0.5 MG/DL
BUN SERPL-MCNC: 28 MG/DL
CALCIUM SERPL-MCNC: 10.9 MG/DL
CHLORIDE SERPL-SCNC: 105 MMOL/L
CO2 SERPL-SCNC: 24 MMOL/L
CREAT SERPL-MCNC: 1.82 MG/DL
GLUCOSE SERPL-MCNC: 89 MG/DL
POTASSIUM SERPL-SCNC: 4.7 MMOL/L
PROT SERPL-MCNC: 6.5 G/DL
SODIUM SERPL-SCNC: 137 MMOL/L

## 2021-01-29 ENCOUNTER — APPOINTMENT (OUTPATIENT)
Dept: CARDIOLOGY | Facility: CLINIC | Age: 81
End: 2021-01-29
Payer: MEDICARE

## 2021-01-29 ENCOUNTER — NON-APPOINTMENT (OUTPATIENT)
Age: 81
End: 2021-01-29

## 2021-01-29 VITALS
RESPIRATION RATE: 16 BRPM | DIASTOLIC BLOOD PRESSURE: 65 MMHG | TEMPERATURE: 97.1 F | SYSTOLIC BLOOD PRESSURE: 145 MMHG | WEIGHT: 195 LBS | HEART RATE: 100 BPM | BODY MASS INDEX: 25.84 KG/M2 | HEIGHT: 73 IN

## 2021-01-29 DIAGNOSIS — Z00.00 ENCOUNTER FOR GENERAL ADULT MEDICAL EXAMINATION W/OUT ABNORMAL FINDINGS: ICD-10-CM

## 2021-01-29 DIAGNOSIS — R40.4 TRANSIENT ALTERATION OF AWARENESS: ICD-10-CM

## 2021-01-29 DIAGNOSIS — R01.1 CARDIAC MURMUR, UNSPECIFIED: ICD-10-CM

## 2021-01-29 PROCEDURE — 93000 ELECTROCARDIOGRAM COMPLETE: CPT

## 2021-01-29 PROCEDURE — 99204 OFFICE O/P NEW MOD 45 MIN: CPT

## 2021-01-29 NOTE — ASSESSMENT
[FreeTextEntry1] : ECG: SR, 1st degree AV delay, PVC, NSST \par \par BUN 28 Creat 1.82 (1/2021) \par A1C 4.9 (1/2021)\par Hgb 10.7 (1/2021)\par  HDL 39 LDL 68  (1/2021) \par \par HEAD CT 1/2021:\par 1. No mass or hemorrhage

## 2021-01-29 NOTE — DISCUSSION/SUMMARY
[FreeTextEntry1] : Patient is a 81yo M with HTN, HLD, CKD here for cardiac evaluation of an abnormal ECG and episoe disorientation. ? TGA, possible vagal reaction from procedure or infection, although no signs persistent UTI afterward.  \par -ECG with 1st degree AV delay, NSST and PVC. AT risk of bradyarrhythmia and will arrang eholter. NEeds carotid as well due to episode. \par -Limited functional capacity due to back, unable to go on treadmill. \par -Recommend ischemic work up due to high risk with HTN, age, HLD, CKD and an abnormal ECG. \par -Edema maybe CVI, CKD but will evaluate cardiac as well with echo. Does not otherwise appear in decompensated CHF. No benefit from compression stockings in past, taken off diuretic by renal. \par \par 1. Echo and pharm nuclear stress test due to abnormal ECG, edema and high CV risk\par 2. Carotid for disorientation episode\par 3. Holter due to PVC and significant 1st degree AV delay\par 4. Continue BP medication and monitor, no change now\par 5. consider prn diuretic for edema, no benefit from compression stockings in past\par 6. Neuro eval pending, follow up PMD as well\par 7. CV stable for prostate surgery in March\par 8. Follow up after testing

## 2021-01-29 NOTE — HISTORY OF PRESENT ILLNESS
[FreeTextEntry1] : Patient is a 81yo M with HTN, HLD, CKD here for cardiac evaluation of an abnormal ECG. Was seen by PMD recently, had episode of confusion/disorientation that has resolved. Went in for catheter at Urology office, when got home was very confused and talking non-sense. IMproved by the next day. Saw PMD, BW/ECG and head CT done.  Deniese CP/SOB. Patient denies PND/orthopnea/palpitations/syncope/claudication.  Gets some ankle swelling intermittently. Has some chronic intermittent back pain. \par \par Lives with his wife. Worked as a , retired 20 years. \par \par ROS: Struggles with urination and BPH, all others negative  Will be needing prostate surgery as well due to significant BPH.

## 2021-01-29 NOTE — PHYSICAL EXAM
[General Appearance - Well Developed] : well developed [General Appearance - Well Nourished] : well nourished [Normal Conjunctiva] : the conjunctiva exhibited no abnormalities [Normal Oropharynx] : normal oropharynx [Normal Jugular Venous V Waves Present] : normal jugular venous V waves present [Heart Rate And Rhythm] : heart rate and rhythm were normal [Heart Sounds] : normal S1 and S2 [Respiration, Rhythm And Depth] : normal respiratory rhythm and effort [Auscultation Breath Sounds / Voice Sounds] : lungs were clear to auscultation bilaterally [Abdomen Soft] : soft [Abdomen Tenderness] : non-tender [Abnormal Walk] : normal gait [Nail Clubbing] : no clubbing of the fingernails [Cyanosis, Localized] : no localized cyanosis [Skin Color & Pigmentation] : normal skin color and pigmentation [Oriented To Time, Place, And Person] : oriented to person, place, and time [Affect] : the affect was normal [FreeTextEntry1] : 1-2+ pitting edema at ankles to mid-shin

## 2021-02-01 ENCOUNTER — NON-APPOINTMENT (OUTPATIENT)
Age: 81
End: 2021-02-01

## 2021-02-01 LAB
ESTIMATED AVERAGE GLUCOSE: 94 MG/DL
HBA1C MFR BLD HPLC: 4.9 %
TSH SERPL-ACNC: 1.49 UIU/ML

## 2021-02-08 LAB
BASOPHILS # BLD AUTO: 0.02 K/UL
BASOPHILS NFR BLD AUTO: 0.3 %
CHOLEST SERPL-MCNC: 128 MG/DL
EOSINOPHIL # BLD AUTO: 0.07 K/UL
EOSINOPHIL NFR BLD AUTO: 1.1 %
HCT VFR BLD CALC: 34.7 %
HDLC SERPL-MCNC: 39 MG/DL
HGB BLD-MCNC: 10.7 G/DL
IMM GRANULOCYTES NFR BLD AUTO: 0.8 %
LDLC SERPL CALC-MCNC: 68 MG/DL
LYMPHOCYTES # BLD AUTO: 1.08 K/UL
LYMPHOCYTES NFR BLD AUTO: 17.4 %
MAN DIFF?: NORMAL
MCHC RBC-ENTMCNC: 30.8 GM/DL
MCHC RBC-ENTMCNC: 31.9 PG
MCV RBC AUTO: 103.6 FL
MONOCYTES # BLD AUTO: 0.59 K/UL
MONOCYTES NFR BLD AUTO: 9.5 %
NEUTROPHILS # BLD AUTO: 4.39 K/UL
NEUTROPHILS NFR BLD AUTO: 70.9 %
NONHDLC SERPL-MCNC: 88 MG/DL
PLATELET # BLD AUTO: 188 K/UL
RBC # BLD: 3.35 M/UL
RBC # FLD: 12.1 %
TRIGL SERPL-MCNC: 102 MG/DL
WBC # FLD AUTO: 6.2 K/UL

## 2021-02-17 ENCOUNTER — APPOINTMENT (OUTPATIENT)
Dept: CARDIOLOGY | Facility: CLINIC | Age: 81
End: 2021-02-17
Payer: MEDICARE

## 2021-02-17 PROCEDURE — 93880 EXTRACRANIAL BILAT STUDY: CPT

## 2021-02-17 PROCEDURE — 93306 TTE W/DOPPLER COMPLETE: CPT

## 2021-02-23 ENCOUNTER — OUTPATIENT (OUTPATIENT)
Dept: OUTPATIENT SERVICES | Facility: HOSPITAL | Age: 81
LOS: 1 days | End: 2021-02-23
Payer: MEDICARE

## 2021-02-23 VITALS
HEART RATE: 74 BPM | TEMPERATURE: 99 F | OXYGEN SATURATION: 100 % | DIASTOLIC BLOOD PRESSURE: 67 MMHG | RESPIRATION RATE: 16 BRPM | SYSTOLIC BLOOD PRESSURE: 137 MMHG | WEIGHT: 192.9 LBS

## 2021-02-23 DIAGNOSIS — Z98.890 OTHER SPECIFIED POSTPROCEDURAL STATES: Chronic | ICD-10-CM

## 2021-02-23 DIAGNOSIS — Z41.9 ENCOUNTER FOR PROCEDURE FOR PURPOSES OTHER THAN REMEDYING HEALTH STATE, UNSPECIFIED: Chronic | ICD-10-CM

## 2021-02-23 DIAGNOSIS — Z01.818 ENCOUNTER FOR OTHER PREPROCEDURAL EXAMINATION: ICD-10-CM

## 2021-02-23 DIAGNOSIS — N40.1 BENIGN PROSTATIC HYPERPLASIA WITH LOWER URINARY TRACT SYMPTOMS: ICD-10-CM

## 2021-02-23 DIAGNOSIS — R33.8 OTHER RETENTION OF URINE: ICD-10-CM

## 2021-02-23 LAB
ALBUMIN SERPL ELPH-MCNC: 3.5 G/DL — SIGNIFICANT CHANGE UP (ref 3.3–5)
ALP SERPL-CCNC: 68 U/L — SIGNIFICANT CHANGE UP (ref 40–120)
ALT FLD-CCNC: 18 U/L — SIGNIFICANT CHANGE UP (ref 12–78)
ANION GAP SERPL CALC-SCNC: 4 MMOL/L — LOW (ref 5–17)
APPEARANCE UR: ABNORMAL
AST SERPL-CCNC: 19 U/L — SIGNIFICANT CHANGE UP (ref 15–37)
BACTERIA # UR AUTO: ABNORMAL
BILIRUB SERPL-MCNC: 0.5 MG/DL — SIGNIFICANT CHANGE UP (ref 0.2–1.2)
BILIRUB UR-MCNC: NEGATIVE — SIGNIFICANT CHANGE UP
BUN SERPL-MCNC: 32 MG/DL — HIGH (ref 7–23)
CALCIUM SERPL-MCNC: 11 MG/DL — HIGH (ref 8.5–10.1)
CHLORIDE SERPL-SCNC: 111 MMOL/L — HIGH (ref 96–108)
CO2 SERPL-SCNC: 28 MMOL/L — SIGNIFICANT CHANGE UP (ref 22–31)
COLOR SPEC: YELLOW — SIGNIFICANT CHANGE UP
CREAT SERPL-MCNC: 1.7 MG/DL — HIGH (ref 0.5–1.3)
DIFF PNL FLD: ABNORMAL
EPI CELLS # UR: NEGATIVE — SIGNIFICANT CHANGE UP
GLUCOSE SERPL-MCNC: 84 MG/DL — SIGNIFICANT CHANGE UP (ref 70–99)
GLUCOSE UR QL: NEGATIVE — SIGNIFICANT CHANGE UP
HCT VFR BLD CALC: 33.9 % — LOW (ref 39–50)
HGB BLD-MCNC: 10.6 G/DL — LOW (ref 13–17)
KETONES UR-MCNC: NEGATIVE — SIGNIFICANT CHANGE UP
LEUKOCYTE ESTERASE UR-ACNC: ABNORMAL
MCHC RBC-ENTMCNC: 31.2 PG — SIGNIFICANT CHANGE UP (ref 27–34)
MCHC RBC-ENTMCNC: 31.3 GM/DL — LOW (ref 32–36)
MCV RBC AUTO: 99.7 FL — SIGNIFICANT CHANGE UP (ref 80–100)
NITRITE UR-MCNC: NEGATIVE — SIGNIFICANT CHANGE UP
NRBC # BLD: 0 /100 WBCS — SIGNIFICANT CHANGE UP (ref 0–0)
PH UR: 6 — SIGNIFICANT CHANGE UP (ref 5–8)
PLATELET # BLD AUTO: 231 K/UL — SIGNIFICANT CHANGE UP (ref 150–400)
POTASSIUM SERPL-MCNC: 5.1 MMOL/L — SIGNIFICANT CHANGE UP (ref 3.5–5.3)
POTASSIUM SERPL-SCNC: 5.1 MMOL/L — SIGNIFICANT CHANGE UP (ref 3.5–5.3)
PROT SERPL-MCNC: 7.5 G/DL — SIGNIFICANT CHANGE UP (ref 6–8.3)
PROT UR-MCNC: 30 MG/DL
RBC # BLD: 3.4 M/UL — LOW (ref 4.2–5.8)
RBC # FLD: 12.9 % — SIGNIFICANT CHANGE UP (ref 10.3–14.5)
RBC CASTS # UR COMP ASSIST: SIGNIFICANT CHANGE UP /HPF (ref 0–4)
SODIUM SERPL-SCNC: 143 MMOL/L — SIGNIFICANT CHANGE UP (ref 135–145)
SP GR SPEC: 1.01 — SIGNIFICANT CHANGE UP (ref 1.01–1.02)
UROBILINOGEN FLD QL: 1
WBC # BLD: 7.26 K/UL — SIGNIFICANT CHANGE UP (ref 3.8–10.5)
WBC # FLD AUTO: 7.26 K/UL — SIGNIFICANT CHANGE UP (ref 3.8–10.5)
WBC UR QL: ABNORMAL

## 2021-02-23 PROCEDURE — 80053 COMPREHEN METABOLIC PANEL: CPT

## 2021-02-23 PROCEDURE — 36415 COLL VENOUS BLD VENIPUNCTURE: CPT

## 2021-02-23 PROCEDURE — G0463: CPT

## 2021-02-23 PROCEDURE — 81001 URINALYSIS AUTO W/SCOPE: CPT

## 2021-02-23 PROCEDURE — 85027 COMPLETE CBC AUTOMATED: CPT

## 2021-02-23 PROCEDURE — 87086 URINE CULTURE/COLONY COUNT: CPT

## 2021-02-23 PROCEDURE — 87186 SC STD MICRODIL/AGAR DIL: CPT

## 2021-02-23 NOTE — H&P PST ADULT - COMMENTS
Pt states he has had years of bilateral ankle swelling, seen by nephrologist and diuretics stopped and referred to urologist.

## 2021-02-23 NOTE — H&P PST ADULT - NSICDXPASTMEDICALHX_GEN_ALL_CORE_FT
PAST MEDICAL HISTORY:  BPH (benign prostatic hyperplasia)     Chronic kidney disease (CKD)     Hyperlipidemia     Hypertension      PAST MEDICAL HISTORY:  BPH (benign prostatic hyperplasia)     Chronic kidney disease (CKD)     Enlarged prostate with lower urinary tract symptoms (LUTS)     Hyperlipidemia     Hypertension

## 2021-02-23 NOTE — H&P PST ADULT - GENERAL COMMENTS
Pt denies history of travel outside the country and outside [facility], denies having had the COVID19 infection and denies COVID19 positive contacts within the last 14 days.

## 2021-02-23 NOTE — H&P PST ADULT - CARDIOVASCULAR COMMENTS
Pt was seen by cardiologist for abnormal EKG on 1/29/2021, s/p echo, carotid doppler and holter monitor. To have stress test done end of March 2021.

## 2021-02-23 NOTE — H&P PST ADULT - NSICDXPROBLEM_GEN_ALL_CORE_FT
PROBLEM DIAGNOSES  Problem: Preoperative testing  Assessment and Plan: Medical clearance needed as per surgeon. CBC, Comprehensive panel, UA, Urine culture ordered. EKG received from 1/29/2021. Pre-op instructions given and pt verbalized understanding. COVID19 PCR testing to be done within 72 hours of surgery at [facility].     Problem: Enlarged prostate with lower urinary tract symptoms (LUTS)  Assessment and Plan: Greenlight laser prostate on 3/5/2021.

## 2021-02-23 NOTE — H&P PST ADULT - HISTORY OF PRESENT ILLNESS
80 y/o male with PMH of BPH and HTN here for PST. Pt complaining of urinary retention getting progressively worse over the last 1-2 years. Pt denies urinary frequency, hematuria and dysuria. Pt denies abdominal and pelvic pain. Pt electing for greenlight laser prostate on 3/5/2021.

## 2021-02-23 NOTE — H&P PST ADULT - CORNEAL ABRASION RISK
Pt uses artifical tear eye drops/daily eye drops Pt uses artifical tear eye drops daily for dry eyes/daily eye drops

## 2021-02-23 NOTE — H&P PST ADULT - NSICDXPASTSURGICALHX_GEN_ALL_CORE_FT
PAST SURGICAL HISTORY:  Elective surgery (Laser vaporization of prostate, 2005)    S/P colonoscopy     S/P eye surgery (Bilateral eyes "for calcium on my eyes")    S/P repair of hydrocele (Right, 2012)

## 2021-02-25 ENCOUNTER — APPOINTMENT (OUTPATIENT)
Dept: INTERNAL MEDICINE | Facility: CLINIC | Age: 81
End: 2021-02-25
Payer: MEDICARE

## 2021-02-25 VITALS
DIASTOLIC BLOOD PRESSURE: 71 MMHG | BODY MASS INDEX: 25.84 KG/M2 | TEMPERATURE: 97.8 F | WEIGHT: 195 LBS | SYSTOLIC BLOOD PRESSURE: 139 MMHG | HEART RATE: 89 BPM | HEIGHT: 73 IN

## 2021-02-25 DIAGNOSIS — N39.0 URINARY TRACT INFECTION, SITE NOT SPECIFIED: ICD-10-CM

## 2021-02-25 PROCEDURE — 99214 OFFICE O/P EST MOD 30 MIN: CPT

## 2021-02-25 NOTE — RESULTS/DATA
[] : results reviewed [ECG Reviewed] : reviewed [No Acute Ischemia] : No acute ischemia [de-identified] : Urine culture positive for E.COLI, patient started on antibiotics today.

## 2021-02-25 NOTE — ASSESSMENT
[Patient Optimized for Surgery] : Patient optimized for surgery [No Further Testing Recommended] : no further testing recommended [As per surgery] : as per surgery [FreeTextEntry4] : Mr. DESIRAE OAKLEY is a 80 year male with a PMH of HTN, CKD, HLD comes to the office for preoperative evaluation. Patient has greater than 4 METS, is a high perioperative risk for Major adverse cardiac event. ECG and blood work reviewed. No further testing indicated at this time. Cardiac clearance reviewed. Patient will complete course of antibiotics for UTI prior to procedure. Patient is medically optimized for this procedure.

## 2021-02-25 NOTE — HISTORY OF PRESENT ILLNESS
[No Pertinent Cardiac History] : no history of aortic stenosis, atrial fibrillation, coronary artery disease, recent myocardial infarction, or implantable device/pacemaker [No Pertinent Pulmonary History] : no history of asthma, COPD, sleep apnea, or smoking [No Adverse Anesthesia Reaction] : no adverse anesthesia reaction in self or family member [Chronic Kidney Disease] : chronic kidney disease [(Patient denies any chest pain, claudication, dyspnea on exertion, orthopnea, palpitations or syncope)] : Patient denies any chest pain, claudication, dyspnea on exertion, orthopnea, palpitations or syncope [Moderate (4-6 METs)] : Moderate (4-6 METs) [Chronic Anticoagulation] : no chronic anticoagulation [Diabetes] : no diabetes [FreeTextEntry1] : Laser Vaporization of prostate for urine flow [FreeTextEntry2] : 03/05/21 [FreeTextEntry3] : Dr. Tyler Silva [FreeTextEntry4] : Mr. DESIRAE OAKLEY is a 80 year male with a PMH of HTN, CKD, HLD comes to the office for preoperative evaluation.

## 2021-02-25 NOTE — PLAN
[FreeTextEntry1] : Mr. DESIRAE OAKLEY is a 80 year male with a PMH of HTN, CKD, HLD comes to the office for preoperative evaluation. Patient has greater than 4 METS, is a high perioperative risk for Major adverse cardiac event. ECG and blood work reviewed. No further testing indicated at this time. Cardiac clearance reviewed. Patient will complete course of antibiotics for UTI prior to procedure. Patient is medically optimized for this procedure. \par \par During conversation with patient extensive medical records were reviewed including but not limited to, Hospital records records, out patient records, laboratory data and microbiology data \par In addition extensive time was also spent in reviewing diagnostic studies.\par \par Total encounter total time 30 mins\par >50% of time spent counseling/coordinating care \par \par Counseling included abnormal lab results, differential diagnoses, treatment options, risks and benefits, lifestyle changes, current condition, medications, and dose adjustments. \par The patient was interactive, attentive, asked questions, and verbalized understanding

## 2021-02-26 ENCOUNTER — RX RENEWAL (OUTPATIENT)
Age: 81
End: 2021-02-26

## 2021-02-26 RX ORDER — SULFAMETHOXAZOLE AND TRIMETHOPRIM 800; 160 MG/1; MG/1
800-160 TABLET ORAL TWICE DAILY
Qty: 10 | Refills: 0 | Status: DISCONTINUED | COMMUNITY
Start: 2021-02-25 | End: 2021-02-26

## 2021-03-03 ENCOUNTER — OUTPATIENT (OUTPATIENT)
Dept: OUTPATIENT SERVICES | Facility: HOSPITAL | Age: 81
LOS: 1 days | End: 2021-03-03
Payer: MEDICARE

## 2021-03-03 DIAGNOSIS — Z98.890 OTHER SPECIFIED POSTPROCEDURAL STATES: Chronic | ICD-10-CM

## 2021-03-03 DIAGNOSIS — Z20.828 CONTACT WITH AND (SUSPECTED) EXPOSURE TO OTHER VIRAL COMMUNICABLE DISEASES: ICD-10-CM

## 2021-03-03 DIAGNOSIS — Z41.9 ENCOUNTER FOR PROCEDURE FOR PURPOSES OTHER THAN REMEDYING HEALTH STATE, UNSPECIFIED: Chronic | ICD-10-CM

## 2021-03-03 PROBLEM — E78.5 HYPERLIPIDEMIA, UNSPECIFIED: Chronic | Status: ACTIVE | Noted: 2021-02-23

## 2021-03-03 PROBLEM — N40.1 BENIGN PROSTATIC HYPERPLASIA WITH LOWER URINARY TRACT SYMPTOMS: Chronic | Status: ACTIVE | Noted: 2021-02-23

## 2021-03-03 PROBLEM — N40.0 BENIGN PROSTATIC HYPERPLASIA WITHOUT LOWER URINARY TRACT SYMPTOMS: Chronic | Status: ACTIVE | Noted: 2021-02-23

## 2021-03-03 PROBLEM — I10 ESSENTIAL (PRIMARY) HYPERTENSION: Chronic | Status: ACTIVE | Noted: 2021-02-23

## 2021-03-03 PROBLEM — N18.9 CHRONIC KIDNEY DISEASE, UNSPECIFIED: Chronic | Status: ACTIVE | Noted: 2021-02-23

## 2021-03-03 PROCEDURE — U0003: CPT

## 2021-03-03 PROCEDURE — U0005: CPT

## 2021-03-04 LAB — SARS-COV-2 RNA SPEC QL NAA+PROBE: SIGNIFICANT CHANGE UP

## 2021-03-05 ENCOUNTER — OUTPATIENT (OUTPATIENT)
Dept: OUTPATIENT SERVICES | Facility: HOSPITAL | Age: 81
LOS: 1 days | End: 2021-03-05
Payer: MEDICARE

## 2021-03-05 VITALS
TEMPERATURE: 98 F | OXYGEN SATURATION: 97 % | HEART RATE: 67 BPM | RESPIRATION RATE: 18 BRPM | DIASTOLIC BLOOD PRESSURE: 61 MMHG | SYSTOLIC BLOOD PRESSURE: 167 MMHG | WEIGHT: 192.9 LBS

## 2021-03-05 VITALS
DIASTOLIC BLOOD PRESSURE: 65 MMHG | HEART RATE: 61 BPM | TEMPERATURE: 98 F | OXYGEN SATURATION: 98 % | RESPIRATION RATE: 13 BRPM | SYSTOLIC BLOOD PRESSURE: 135 MMHG

## 2021-03-05 DIAGNOSIS — Z98.890 OTHER SPECIFIED POSTPROCEDURAL STATES: Chronic | ICD-10-CM

## 2021-03-05 DIAGNOSIS — N40.1 BENIGN PROSTATIC HYPERPLASIA WITH LOWER URINARY TRACT SYMPTOMS: ICD-10-CM

## 2021-03-05 DIAGNOSIS — R33.8 OTHER RETENTION OF URINE: ICD-10-CM

## 2021-03-05 DIAGNOSIS — Z41.9 ENCOUNTER FOR PROCEDURE FOR PURPOSES OTHER THAN REMEDYING HEALTH STATE, UNSPECIFIED: Chronic | ICD-10-CM

## 2021-03-05 PROCEDURE — C1889: CPT

## 2021-03-05 PROCEDURE — C1769: CPT

## 2021-03-05 PROCEDURE — 52648 LASER SURGERY OF PROSTATE: CPT

## 2021-03-05 RX ORDER — TAMSULOSIN HYDROCHLORIDE 0.4 MG/1
0 CAPSULE ORAL
Qty: 0 | Refills: 0 | DISCHARGE

## 2021-03-05 RX ORDER — ACETAMINOPHEN 500 MG
1000 TABLET ORAL ONCE
Refills: 0 | Status: COMPLETED | OUTPATIENT
Start: 2021-03-05 | End: 2021-03-05

## 2021-03-05 RX ORDER — PREGABALIN 225 MG/1
0 CAPSULE ORAL
Qty: 0 | Refills: 0 | DISCHARGE

## 2021-03-05 RX ORDER — HYDROMORPHONE HYDROCHLORIDE 2 MG/ML
0.5 INJECTION INTRAMUSCULAR; INTRAVENOUS; SUBCUTANEOUS
Refills: 0 | Status: DISCONTINUED | OUTPATIENT
Start: 2021-03-05 | End: 2021-03-05

## 2021-03-05 RX ORDER — CEFUROXIME AXETIL 250 MG
1 TABLET ORAL
Qty: 0 | Refills: 0 | DISCHARGE

## 2021-03-05 RX ORDER — CEFTRIAXONE 500 MG/1
1000 INJECTION, POWDER, FOR SOLUTION INTRAMUSCULAR; INTRAVENOUS ONCE
Refills: 0 | Status: COMPLETED | OUTPATIENT
Start: 2021-03-05 | End: 2021-03-05

## 2021-03-05 RX ORDER — ONDANSETRON 8 MG/1
4 TABLET, FILM COATED ORAL ONCE
Refills: 0 | Status: DISCONTINUED | OUTPATIENT
Start: 2021-03-05 | End: 2021-03-05

## 2021-03-05 RX ORDER — LOSARTAN POTASSIUM 100 MG/1
0 TABLET, FILM COATED ORAL
Qty: 0 | Refills: 0 | DISCHARGE

## 2021-03-05 RX ORDER — SODIUM CHLORIDE 9 MG/ML
1000 INJECTION, SOLUTION INTRAVENOUS
Refills: 0 | Status: DISCONTINUED | OUTPATIENT
Start: 2021-03-05 | End: 2021-03-05

## 2021-03-05 RX ORDER — ATORVASTATIN CALCIUM 80 MG/1
0 TABLET, FILM COATED ORAL
Qty: 0 | Refills: 0 | DISCHARGE

## 2021-03-05 RX ADMIN — SODIUM CHLORIDE 75 MILLILITER(S): 9 INJECTION, SOLUTION INTRAVENOUS at 09:26

## 2021-03-05 RX ADMIN — Medication 400 MILLIGRAM(S): at 09:26

## 2021-03-05 NOTE — ASU PATIENT PROFILE, ADULT - PMH
BPH (benign prostatic hyperplasia)    Chronic kidney disease (CKD)    Enlarged prostate with lower urinary tract symptoms (LUTS)    Hyperlipidemia    Hypertension

## 2021-03-05 NOTE — BRIEF OPERATIVE NOTE - NSICDXBRIEFPROCEDURE_GEN_ALL_CORE_FT
PROCEDURES:  Photoselective vaporization of prostate (PVP) with GreenLight laser 05-Mar-2021 09:05:43 and dilation of urethral stricture Tyler Silva

## 2021-03-05 NOTE — ASU PATIENT PROFILE, ADULT - PSH
Elective surgery  (Laser vaporization of prostate, 2005)  S/P colonoscopy    S/P eye surgery  (Bilateral eyes "for calcium on my eyes")  S/P repair of hydrocele  (Right, 2012)

## 2021-03-05 NOTE — BRIEF OPERATIVE NOTE - NSICDXBRIEFPOSTOP_GEN_ALL_CORE_FT
POST-OP DIAGNOSIS:  Postprocedural bulbous urethral stricture 05-Mar-2021 09:06:48  Tyler Silva  BPH with urinary obstruction 05-Mar-2021 09:06:31  Tyler Silva

## 2021-03-05 NOTE — ASU DISCHARGE PLAN (ADULT/PEDIATRIC) - CARE PROVIDER_API CALL
Tyler Silva  UROLOGY  [address], [address]  [place] [address]  Phone: [phone]  Fax: [phone]  Follow Up Time:

## 2021-03-08 ENCOUNTER — RX RENEWAL (OUTPATIENT)
Age: 81
End: 2021-03-08

## 2021-03-09 ENCOUNTER — RX RENEWAL (OUTPATIENT)
Age: 81
End: 2021-03-09

## 2021-03-31 ENCOUNTER — RX RENEWAL (OUTPATIENT)
Age: 81
End: 2021-03-31

## 2021-05-06 ENCOUNTER — APPOINTMENT (OUTPATIENT)
Dept: CARDIOLOGY | Facility: CLINIC | Age: 81
End: 2021-05-06

## 2021-05-13 ENCOUNTER — APPOINTMENT (OUTPATIENT)
Dept: CARDIOLOGY | Facility: CLINIC | Age: 81
End: 2021-05-13
Payer: MEDICARE

## 2021-05-13 ENCOUNTER — NON-APPOINTMENT (OUTPATIENT)
Age: 81
End: 2021-05-13

## 2021-05-13 VITALS
OXYGEN SATURATION: 98 % | HEART RATE: 81 BPM | SYSTOLIC BLOOD PRESSURE: 154 MMHG | DIASTOLIC BLOOD PRESSURE: 61 MMHG | BODY MASS INDEX: 25.05 KG/M2 | HEIGHT: 73 IN | RESPIRATION RATE: 16 BRPM | TEMPERATURE: 97.3 F | WEIGHT: 189 LBS

## 2021-05-13 PROCEDURE — 93000 ELECTROCARDIOGRAM COMPLETE: CPT

## 2021-05-13 PROCEDURE — 99214 OFFICE O/P EST MOD 30 MIN: CPT

## 2021-05-13 NOTE — PHYSICAL EXAM
[General Appearance - Well Developed] : well developed [General Appearance - Well Nourished] : well nourished [Normal Conjunctiva] : the conjunctiva exhibited no abnormalities [Normal Oropharynx] : normal oropharynx [Normal Jugular Venous V Waves Present] : normal jugular venous V waves present [Heart Rate And Rhythm] : heart rate and rhythm were normal [Heart Sounds] : normal S1 and S2 [Respiration, Rhythm And Depth] : normal respiratory rhythm and effort [Auscultation Breath Sounds / Voice Sounds] : lungs were clear to auscultation bilaterally [Abdomen Soft] : soft [Abdomen Tenderness] : non-tender [Abnormal Walk] : normal gait [Nail Clubbing] : no clubbing of the fingernails [Cyanosis, Localized] : no localized cyanosis [Skin Color & Pigmentation] : normal skin color and pigmentation [Oriented To Time, Place, And Person] : oriented to person, place, and time [Affect] : the affect was normal [FreeTextEntry1] : 1+ pitting edema at ankles to mid-shin

## 2021-05-13 NOTE — DISCUSSION/SUMMARY
[FreeTextEntry1] : Patient is a 79yo M with HTN, HLD, CKD here for cardiac evaluation of an abnormal ECG and episoe disorientation. ? TGA, possible vagal reaction from procedure or infection, although no signs persistent UTI afterward.  \par -ECG with 1st degree AV delay, NSST and PVC. \par -Limited functional capacity due to back, unable to go on treadmill. \par -Edema maybe CVI, CKD. Does not appear in CHF, PAP and LAP normal on echo. Will check BNP as well. No benefit from compression stockings in past, taken off diuretic by renal. 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 No LV dysfunction and chamber not enlarged\par -Carotid with mild stenosis 2/201, med management \par --Recommend ischemic work up due to high risk with HTN, age, HLD, CKD and an abnormal ECG, frequent ectopy as well on monitor. EF normal. \par \par 1.  pharm nuclear stress test due to abnormal ECG, edema and high CV risk\par 2. Continue medical management of carotid stenosis\par 3. surveillance of AI\par 4. Trial of increasing losartan to 50mg po bid for better BP control, will hold off on beta blocker due to significant AI. No symptoms from ectopy and no runs VT on monitor \par 5. Check BMP/Mg and BNP in a couple weeks\par 6. Follow up after testing \par \par

## 2021-05-13 NOTE — ASSESSMENT
[FreeTextEntry1] : ECG: SR, 1st degree AV delay, NSST \par \par BUN 28 Creat 1.82 (1/2021) \par A1C 4.9 (1/2021)\par Hgb 10.7 (1/2021)\par  HDL 39 LDL 68  (1/2021) \par \par CAROTID 2/201:\par 1. Mild plaque\par 2. Antegrade flow in the vertebral arteries bilaterally \par \par ECHO 2/2021:\par 1. Normal LV size and function, EF 60-65%\par 2. GRade I diastolic dysfunction\par 3. Moderate to severe AI\par 4. Mild MR and TR, RVSP 15mmHg\par \par HOLTER 1/2021:\par 1. SR, average HR 77\par 2. Frequent multifocal PVCs (136/hour, 3,264/day)\par 3. Bigeminy, trigeminy\par \par HEAD CT 1/2021:\par 1. No mass or hemorrhage

## 2021-05-13 NOTE — HISTORY OF PRESENT ILLNESS
[FreeTextEntry1] : Patient is a 82yo M with HTN, HLD, CKD here for cardiac follow up  of an abnormal ECG. Was seen by PMD over the winter had episode of confusion/disorientation that has resolved. Went in for catheter at Urology office, when got home was very confused and talking non-sense. IMproved by the next day. Saw PMD, BW/ECG and head CT done.  Deniese CP/SOB. Patient denies PND/orthopnea/palpitations/syncope/claudication.  Gets some ankle swelling intermittently. Has some chronic intermittent back pain. Patient underwent cardiac testing after last visit. Denies CP/SOB at this time, limits physical activity due to back. Patient denies PND/orthopnea/edema/palpitations/syncope/claudication. No recurrent confusion episodes. HAd recent prostate surgery and tolerated. \par \par Lives with his wife. Worked as a , retired 20 years. \par \par ROS: Struggles with urination and BPH, all others negative  Will be needing prostate surgery as well due to significant BPH.

## 2021-06-17 ENCOUNTER — APPOINTMENT (OUTPATIENT)
Dept: INTERNAL MEDICINE | Facility: CLINIC | Age: 81
End: 2021-06-17
Payer: MEDICARE

## 2021-06-17 VITALS
HEART RATE: 93 BPM | SYSTOLIC BLOOD PRESSURE: 137 MMHG | HEIGHT: 73 IN | DIASTOLIC BLOOD PRESSURE: 84 MMHG | BODY MASS INDEX: 25.05 KG/M2 | WEIGHT: 189 LBS | TEMPERATURE: 97.1 F

## 2021-06-17 DIAGNOSIS — H26.9 UNSPECIFIED CATARACT: ICD-10-CM

## 2021-06-17 DIAGNOSIS — Z01.818 ENCOUNTER FOR OTHER PREPROCEDURAL EXAMINATION: ICD-10-CM

## 2021-06-17 PROCEDURE — 99214 OFFICE O/P EST MOD 30 MIN: CPT

## 2021-06-17 NOTE — HISTORY OF PRESENT ILLNESS
[No Pertinent Cardiac History] : no history of aortic stenosis, atrial fibrillation, coronary artery disease, recent myocardial infarction, or implantable device/pacemaker [No Pertinent Pulmonary History] : no history of asthma, COPD, sleep apnea, or smoking [No Adverse Anesthesia Reaction] : no adverse anesthesia reaction in self or family member [Chronic Anticoagulation] : no chronic anticoagulation [Chronic Kidney Disease] : chronic kidney disease [Diabetes] : no diabetes [(Patient denies any chest pain, claudication, dyspnea on exertion, orthopnea, palpitations or syncope)] : Patient denies any chest pain, claudication, dyspnea on exertion, orthopnea, palpitations or syncope [Moderate (4-6 METs)] : Moderate (4-6 METs) [FreeTextEntry1] : Bilateral Cataracts [FreeTextEntry2] : 06/30/21 and 07/14/21 [FreeTextEntry3] : Dr. Hernandez [FreeTextEntry4] : Mr. DESIRAE OAKLEY is a 80 year male with a PMH of HTN, CKD, HLD comes to the office for preoperative evaluation. Patient denies fever, cough SOB. No other complaints at this time.

## 2021-06-17 NOTE — PLAN
[FreeTextEntry1] : Mr. DESIRAE OAKLEY is a 81 year male with a PMH of HTN, CKD, HLD comes to the office for preoperative evaluation. Patient has greater than 4 METS, is a moderate erioperative risk for Major adverse cardiac event. most recent ECG and blood work reviewed. No further testing indicated at this time. Patient is medically optimized for this procedure. \par \par During conversation with patient extensive medical records were reviewed including but not limited to, Hospital records records, out patient records, laboratory data and microbiology data \par In addition extensive time was also spent in reviewing diagnostic studies.\par \par Total encounter total time 30 mins\par >50% of time spent counseling/coordinating care \par \par Counseling included abnormal lab results, differential diagnoses, treatment options, risks and benefits, lifestyle changes, current condition, medications, and dose adjustments. \par The patient was interactive, attentive, asked questions, and verbalized understanding

## 2021-06-17 NOTE — ASSESSMENT
[Patient Optimized for Surgery] : Patient optimized for surgery [No Further Testing Recommended] : no further testing recommended [As per surgery] : as per surgery [FreeTextEntry4] : Mr. DESIRAE OAKLEY is a 81 year male with a PMH of HTN, CKD, HLD comes to the office for preoperative evaluation. Patient has greater than 4 METS, is a moderate erioperative risk for Major adverse cardiac event. most recent ECG and blood work reviewed. No further testing indicated at this time. Patient is medically optimized for this procedure.

## 2021-07-02 ENCOUNTER — APPOINTMENT (OUTPATIENT)
Dept: CARDIOLOGY | Facility: CLINIC | Age: 81
End: 2021-07-02

## 2021-07-07 ENCOUNTER — APPOINTMENT (OUTPATIENT)
Dept: CARDIOLOGY | Facility: CLINIC | Age: 81
End: 2021-07-07

## 2021-07-29 ENCOUNTER — RX RENEWAL (OUTPATIENT)
Age: 81
End: 2021-07-29

## 2021-08-04 ENCOUNTER — RX CHANGE (OUTPATIENT)
Age: 81
End: 2021-08-04

## 2021-08-04 RX ORDER — FLUTICASONE PROPIONATE 50 UG/1
50 SPRAY, METERED NASAL TWICE DAILY
Qty: 16 | Refills: 0 | Status: DISCONTINUED | COMMUNITY
Start: 2019-07-12 | End: 2021-08-04

## 2021-08-19 ENCOUNTER — RX RENEWAL (OUTPATIENT)
Age: 81
End: 2021-08-19

## 2021-09-20 ENCOUNTER — APPOINTMENT (OUTPATIENT)
Dept: INTERNAL MEDICINE | Facility: CLINIC | Age: 81
End: 2021-09-20
Payer: MEDICARE

## 2021-09-20 PROCEDURE — 99442: CPT | Mod: 95

## 2021-09-27 ENCOUNTER — RX RENEWAL (OUTPATIENT)
Age: 81
End: 2021-09-27

## 2021-10-05 ENCOUNTER — RX RENEWAL (OUTPATIENT)
Age: 81
End: 2021-10-05

## 2021-10-20 ENCOUNTER — APPOINTMENT (OUTPATIENT)
Dept: INTERNAL MEDICINE | Facility: CLINIC | Age: 81
End: 2021-10-20
Payer: MEDICARE

## 2021-10-20 VITALS
HEIGHT: 73 IN | SYSTOLIC BLOOD PRESSURE: 139 MMHG | BODY MASS INDEX: 25.05 KG/M2 | HEART RATE: 88 BPM | WEIGHT: 189 LBS | DIASTOLIC BLOOD PRESSURE: 54 MMHG

## 2021-10-20 DIAGNOSIS — Z12.5 ENCOUNTER FOR SCREENING FOR MALIGNANT NEOPLASM OF PROSTATE: ICD-10-CM

## 2021-10-20 DIAGNOSIS — R73.9 HYPERGLYCEMIA, UNSPECIFIED: ICD-10-CM

## 2021-10-20 DIAGNOSIS — E03.8 OTHER SPECIFIED HYPOTHYROIDISM: ICD-10-CM

## 2021-10-20 DIAGNOSIS — D63.1 CHRONIC KIDNEY DISEASE, UNSPECIFIED: ICD-10-CM

## 2021-10-20 DIAGNOSIS — N18.9 CHRONIC KIDNEY DISEASE, UNSPECIFIED: ICD-10-CM

## 2021-10-20 PROCEDURE — 36415 COLL VENOUS BLD VENIPUNCTURE: CPT

## 2021-10-20 PROCEDURE — G0439: CPT

## 2021-10-20 RX ORDER — FLUTICASONE PROPIONATE 50 UG/1
50 SPRAY, METERED NASAL
Qty: 16 | Refills: 0 | Status: COMPLETED | COMMUNITY
Start: 2021-08-04 | End: 2021-10-20

## 2021-10-20 RX ORDER — AMOXICILLIN AND CLAVULANATE POTASSIUM 875; 125 MG/1; MG/1
875-125 TABLET, COATED ORAL TWICE DAILY
Qty: 14 | Refills: 0 | Status: COMPLETED | COMMUNITY
Start: 2021-02-26 | End: 2021-10-20

## 2021-10-20 NOTE — PHYSICAL EXAM
[Well Nourished] : well nourished [Well Developed] : well developed [Well-Appearing] : well-appearing [Normal Sclera/Conjunctiva] : normal sclera/conjunctiva [PERRL] : pupils equal round and reactive to light [EOMI] : extraocular movements intact [Normal Outer Ear/Nose] : the outer ears and nose were normal in appearance [Normal Oropharynx] : the oropharynx was normal [No JVD] : no jugular venous distention [No Lymphadenopathy] : no lymphadenopathy [Supple] : supple [Thyroid Normal, No Nodules] : the thyroid was normal and there were no nodules present [No Respiratory Distress] : no respiratory distress  [No Accessory Muscle Use] : no accessory muscle use [Clear to Auscultation] : lungs were clear to auscultation bilaterally [Normal Rate] : normal rate  [Regular Rhythm] : with a regular rhythm [Normal S1, S2] : normal S1 and S2 [No Murmur] : no murmur heard [No Carotid Bruits] : no carotid bruits [No Abdominal Bruit] : a ~M bruit was not heard ~T in the abdomen [No Varicosities] : no varicosities [Pedal Pulses Present] : the pedal pulses are present [No Palpable Aorta] : no palpable aorta [No Extremity Clubbing/Cyanosis] : no extremity clubbing/cyanosis [Soft] : abdomen soft [Non Tender] : non-tender [Non-distended] : non-distended [No Masses] : no abdominal mass palpated [No HSM] : no HSM [Normal Bowel Sounds] : normal bowel sounds [Normal Posterior Cervical Nodes] : no posterior cervical lymphadenopathy [Normal Anterior Cervical Nodes] : no anterior cervical lymphadenopathy [No CVA Tenderness] : no CVA  tenderness [No Spinal Tenderness] : no spinal tenderness [No Joint Swelling] : no joint swelling [Grossly Normal Strength/Tone] : grossly normal strength/tone [No Rash] : no rash [Coordination Grossly Intact] : coordination grossly intact [No Focal Deficits] : no focal deficits [Normal Gait] : normal gait [Normal Affect] : the affect was normal [Normal Insight/Judgement] : insight and judgment were intact [de-identified] : +1 pitting edema bilaterally lower extremities.

## 2021-10-20 NOTE — HISTORY OF PRESENT ILLNESS
[FreeTextEntry1] : physical [de-identified] : Mr. DESIRAE OAKLEY is a 81 year male with a PMH of HTN, HLD, comes to the office for physical exam. Patient feels well and has no complaints at this time.

## 2021-10-20 NOTE — HEALTH RISK ASSESSMENT
[Good] : ~his/her~  mood as  good [] : Yes [No] : No [0] : 2) Feeling down, depressed, or hopeless: Not at all (0) [PHQ-2 Negative - No further assessment needed] : PHQ-2 Negative - No further assessment needed [Patient declined Low Dose CT Scan] : Patient declined Low Dose CT Scan [Patient declined Retinal Exam] : Patient declined Retinal Exam. [Patient declined mammogram] : Patient declined mammogram [Patient declined PAP Smear] : Patient declined PAP Smear [Patient declined bone density test] : Patient declined bone density test [Patient declined colonoscopy] : Patient declined colonoscopy [HIV test declined] : HIV test declined [Hepatitis C test declined] : Hepatitis C test declined [de-identified] : Urologist [[id number]] : 0

## 2021-10-20 NOTE — PLAN
[FreeTextEntry1] : In regards to patients Physical exam, routine blood work drawn, will review results with patient. \par \par \par patient's BP well controlled with current medication. will continue current  regimen \par \par Patient smokes 1 cigarette a day, patient is not ready to quit. \par \par Counseling included abnormal lab results, differential diagnoses, treatment options, risks and benefits, lifestyle changes, prognosis, current condition, medications, and dose adjustments. \par The patient was interactive, attentive, asked questions, and verbalized understanding

## 2021-10-21 LAB
ALBUMIN SERPL ELPH-MCNC: 4.3 G/DL
ALP BLD-CCNC: 91 U/L
ALT SERPL-CCNC: 13 U/L
ANION GAP SERPL CALC-SCNC: 13 MMOL/L
AST SERPL-CCNC: 18 U/L
BASOPHILS # BLD AUTO: 0.03 K/UL
BASOPHILS NFR BLD AUTO: 0.4 %
BILIRUB SERPL-MCNC: 0.4 MG/DL
BUN SERPL-MCNC: 29 MG/DL
CALCIUM SERPL-MCNC: 11.3 MG/DL
CHLORIDE SERPL-SCNC: 104 MMOL/L
CHOLEST SERPL-MCNC: 131 MG/DL
CO2 SERPL-SCNC: 21 MMOL/L
CREAT SERPL-MCNC: 1.86 MG/DL
EOSINOPHIL # BLD AUTO: 0.07 K/UL
EOSINOPHIL NFR BLD AUTO: 1 %
ESTIMATED AVERAGE GLUCOSE: 88 MG/DL
GLUCOSE SERPL-MCNC: 74 MG/DL
HBA1C MFR BLD HPLC: 4.7 %
HCT VFR BLD CALC: 35.3 %
HDLC SERPL-MCNC: 43 MG/DL
HGB BLD-MCNC: 11 G/DL
IMM GRANULOCYTES NFR BLD AUTO: 0.1 %
LDLC SERPL CALC-MCNC: 69 MG/DL
LYMPHOCYTES # BLD AUTO: 1.65 K/UL
LYMPHOCYTES NFR BLD AUTO: 24.4 %
MAN DIFF?: NORMAL
MCHC RBC-ENTMCNC: 31.2 GM/DL
MCHC RBC-ENTMCNC: 31.9 PG
MCV RBC AUTO: 102.3 FL
MONOCYTES # BLD AUTO: 0.53 K/UL
MONOCYTES NFR BLD AUTO: 7.8 %
NEUTROPHILS # BLD AUTO: 4.47 K/UL
NEUTROPHILS NFR BLD AUTO: 66.3 %
NONHDLC SERPL-MCNC: 88 MG/DL
PLATELET # BLD AUTO: 168 K/UL
POTASSIUM SERPL-SCNC: 4.7 MMOL/L
PROT SERPL-MCNC: 6.9 G/DL
PSA SERPL-MCNC: 7.14 NG/ML
RBC # BLD: 3.45 M/UL
RBC # FLD: 12.3 %
SODIUM SERPL-SCNC: 138 MMOL/L
TRIGL SERPL-MCNC: 96 MG/DL
TSH SERPL-ACNC: 1.73 UIU/ML
WBC # FLD AUTO: 6.76 K/UL

## 2021-11-22 ENCOUNTER — RX RENEWAL (OUTPATIENT)
Age: 81
End: 2021-11-22

## 2022-03-09 ENCOUNTER — RX RENEWAL (OUTPATIENT)
Age: 82
End: 2022-03-09

## 2022-04-22 ENCOUNTER — RX RENEWAL (OUTPATIENT)
Age: 82
End: 2022-04-22

## 2022-10-30 NOTE — H&P PST ADULT - PSYCHIATRIC
-Lipitor held with abn LFTs   negative Affect and characteristics of appearance, verbalizations, behaviors are appropriate

## 2022-11-08 ENCOUNTER — APPOINTMENT (OUTPATIENT)
Dept: CARDIOLOGY | Facility: CLINIC | Age: 82
End: 2022-11-08

## 2022-11-08 ENCOUNTER — NON-APPOINTMENT (OUTPATIENT)
Age: 82
End: 2022-11-08

## 2022-11-08 VITALS
WEIGHT: 174 LBS | HEIGHT: 73 IN | OXYGEN SATURATION: 99 % | SYSTOLIC BLOOD PRESSURE: 161 MMHG | RESPIRATION RATE: 16 BRPM | BODY MASS INDEX: 23.06 KG/M2 | DIASTOLIC BLOOD PRESSURE: 70 MMHG | HEART RATE: 65 BPM

## 2022-11-08 DIAGNOSIS — R94.31 ABNORMAL ELECTROCARDIOGRAM [ECG] [EKG]: ICD-10-CM

## 2022-11-08 DIAGNOSIS — R26.81 UNSTEADINESS ON FEET: ICD-10-CM

## 2022-11-08 DIAGNOSIS — I65.29 OCCLUSION AND STENOSIS OF UNSPECIFIED CAROTID ARTERY: ICD-10-CM

## 2022-11-08 PROCEDURE — 93000 ELECTROCARDIOGRAM COMPLETE: CPT

## 2022-11-08 PROCEDURE — 99214 OFFICE O/P EST MOD 30 MIN: CPT

## 2022-11-08 NOTE — ASSESSMENT
[FreeTextEntry1] : EKG: Sinus rhythm with nonspecific T wave changes.\par \par 82-year-old male with a past medical history of hypertension, dyslipidemia, chronic renal insufficiency, mild carotid disease who presents today for evaluation in part due to recent episode of unsteady gait, weakness and loss of appetite for a few days.  The symptoms he had may very well be related to some kind of viral infection.  He does have a history of possible TGA in the past and I will evaluate to rule out any obvious cardiovascular cause for the symptoms.  Blood pressure is elevated here today.  I have encouraged him to monitor at home to see if changes need to be made to his medication but I will make no change for now.  I have recommended he restart aspirin given his carotid disease.  He also needs to have repeat blood work done.

## 2022-11-08 NOTE — HISTORY OF PRESENT ILLNESS
[FreeTextEntry1] : Patient presents back to the office today having not been seen in about a year and a half.  He presents back today in part because he had an episode about a month ago where he had some unsteadiness in his gait and weakness for a few days associated with some loss of appetite.  No other real specific symptoms and it resolved and has not returned.  He has not had any dizziness at all.  He reports no other specific physical symptoms.  He has not been checking his blood pressure at home but is taking his medication with the exception of aspirin.  Patient denies chest pain, shortness of breath, palpitations, orthopnea, presyncope, syncope.

## 2022-11-08 NOTE — REASON FOR VISIT
[FreeTextEntry1] : Follow-up of hypertension and dyslipidemia with recent episode of unsteady gait and weakness

## 2022-11-08 NOTE — DISCUSSION/SUMMARY
[FreeTextEntry1] : 1.  Check echocardiogram carotid Doppler given his recent issues with unsteady gait and possible neurologic symptoms.\par 2.  Check nuclear stress test given his multiple risk factors for cardiovascular disease.\par 3.  He will have blood work done.\par 4.  Continue current cardiac meds in doses as noted above for hypertension and dyslipidemia.\par 5.  Monitor BP at home, keep a log and bring to f/u.\par 6.  Follow-up with his primary, renal and possibly neurology.\par 7.  Follow up here after testing, and will make further recommendations at that time. [EKG obtained to assist in diagnosis and management of assessed problem(s)] : EKG obtained to assist in diagnosis and management of assessed problem(s)

## 2022-11-09 ENCOUNTER — APPOINTMENT (OUTPATIENT)
Dept: CARDIOLOGY | Facility: CLINIC | Age: 82
End: 2022-11-09

## 2022-11-09 PROCEDURE — A9500: CPT

## 2022-11-09 PROCEDURE — 93015 CV STRESS TEST SUPVJ I&R: CPT

## 2022-11-09 PROCEDURE — 78452 HT MUSCLE IMAGE SPECT MULT: CPT

## 2022-11-09 RX ORDER — REGADENOSON 0.08 MG/ML
0.4 INJECTION, SOLUTION INTRAVENOUS
Qty: 2 | Refills: 0 | Status: COMPLETED | OUTPATIENT
Start: 2022-11-09

## 2022-11-09 RX ADMIN — REGADENOSON 0 MG/5ML: 0.08 INJECTION, SOLUTION INTRAVENOUS at 00:00

## 2022-11-15 LAB
ALBUMIN SERPL ELPH-MCNC: 4 G/DL
ALP BLD-CCNC: 87 U/L
ALT SERPL-CCNC: 12 U/L
ANION GAP SERPL CALC-SCNC: 10 MMOL/L
AST SERPL-CCNC: 19 U/L
BASOPHILS # BLD AUTO: 0.02 K/UL
BASOPHILS NFR BLD AUTO: 0.3 %
BILIRUB SERPL-MCNC: 0.6 MG/DL
BUN SERPL-MCNC: 22 MG/DL
CALCIUM SERPL-MCNC: 11 MG/DL
CHLORIDE SERPL-SCNC: 107 MMOL/L
CHOLEST SERPL-MCNC: 144 MG/DL
CO2 SERPL-SCNC: 25 MMOL/L
CREAT SERPL-MCNC: 1.58 MG/DL
EGFR: 43 ML/MIN/1.73M2
EOSINOPHIL # BLD AUTO: 0.1 K/UL
EOSINOPHIL NFR BLD AUTO: 1.6 %
ESTIMATED AVERAGE GLUCOSE: 94 MG/DL
GLUCOSE SERPL-MCNC: 79 MG/DL
HBA1C MFR BLD HPLC: 4.9 %
HCT VFR BLD CALC: 34.3 %
HDLC SERPL-MCNC: 53 MG/DL
HGB BLD-MCNC: 11.2 G/DL
IMM GRANULOCYTES NFR BLD AUTO: 0.3 %
LDLC SERPL CALC-MCNC: 76 MG/DL
LYMPHOCYTES # BLD AUTO: 2.15 K/UL
LYMPHOCYTES NFR BLD AUTO: 35.1 %
MAGNESIUM SERPL-MCNC: 2 MG/DL
MAN DIFF?: NORMAL
MCHC RBC-ENTMCNC: 32.6 PG
MCHC RBC-ENTMCNC: 32.7 GM/DL
MCV RBC AUTO: 99.7 FL
MONOCYTES # BLD AUTO: 0.45 K/UL
MONOCYTES NFR BLD AUTO: 7.4 %
NEUTROPHILS # BLD AUTO: 3.38 K/UL
NEUTROPHILS NFR BLD AUTO: 55.3 %
NONHDLC SERPL-MCNC: 91 MG/DL
PLATELET # BLD AUTO: 167 K/UL
POTASSIUM SERPL-SCNC: 4.5 MMOL/L
PROT SERPL-MCNC: 6.7 G/DL
RBC # BLD: 3.44 M/UL
RBC # FLD: 12.7 %
SODIUM SERPL-SCNC: 142 MMOL/L
TRIGL SERPL-MCNC: 77 MG/DL
TSH SERPL-ACNC: 1.84 UIU/ML
WBC # FLD AUTO: 6.12 K/UL

## 2022-11-18 ENCOUNTER — APPOINTMENT (OUTPATIENT)
Dept: CARDIOLOGY | Facility: CLINIC | Age: 82
End: 2022-11-18

## 2022-11-18 PROCEDURE — 93880 EXTRACRANIAL BILAT STUDY: CPT

## 2022-11-18 PROCEDURE — 93306 TTE W/DOPPLER COMPLETE: CPT

## 2022-11-28 ENCOUNTER — APPOINTMENT (OUTPATIENT)
Dept: INTERNAL MEDICINE | Facility: CLINIC | Age: 82
End: 2022-11-28

## 2022-11-28 VITALS
BODY MASS INDEX: 23.06 KG/M2 | SYSTOLIC BLOOD PRESSURE: 163 MMHG | DIASTOLIC BLOOD PRESSURE: 62 MMHG | HEART RATE: 70 BPM | WEIGHT: 174 LBS | HEIGHT: 73 IN

## 2022-11-28 DIAGNOSIS — M79.671 PAIN IN RIGHT FOOT: ICD-10-CM

## 2022-11-28 DIAGNOSIS — M79.672 PAIN IN RIGHT FOOT: ICD-10-CM

## 2022-11-28 DIAGNOSIS — L81.9 DISORDER OF PIGMENTATION, UNSPECIFIED: ICD-10-CM

## 2022-11-28 DIAGNOSIS — N18.31 CHRONIC KIDNEY DISEASE, STAGE 3A: ICD-10-CM

## 2022-11-28 PROCEDURE — 36415 COLL VENOUS BLD VENIPUNCTURE: CPT

## 2022-11-28 PROCEDURE — G0439: CPT

## 2022-11-28 NOTE — HEALTH RISK ASSESSMENT
[Current] : Current [0] : 2) Feeling down, depressed, or hopeless: Not at all (0) [PHQ-2 Negative - No further assessment needed] : PHQ-2 Negative - No further assessment needed [HIV test declined] : HIV test declined [Hepatitis C test declined] : Hepatitis C test declined [[id number]] : 0

## 2022-11-28 NOTE — HISTORY OF PRESENT ILLNESS
[FreeTextEntry1] : Physical exam.  [de-identified] : Mr. DESIRAE OAKLEY is a 82 year male with a PMH of HTN, HLD, comes to the office for physical exam. Patient feels well and has no complaints at this time.

## 2022-11-28 NOTE — PLAN
[FreeTextEntry1] : In regards to patients Physical exam, routine blood work drawn, will review results with patient. \par \par \par HTN- BP not well controlled with Losartan 50 mg PO QD, will increase to 100 mg PO QD. Patient will follow up in 1-2 weeks for BP check. \par \par Patient will continue to follow with Cardiologist \par \par Patient smokes 1-2 cigarettes a day, patient is not ready to quit. \par \par Bilateral foot pain- patient was referred to podiatrist \par \par Counseling included abnormal lab results, differential diagnoses, treatment options, risks and benefits, lifestyle changes, prognosis, current condition, medications, and dose adjustments. \par The patient was interactive, attentive, asked questions, and verbalized understanding

## 2022-11-29 LAB
ALBUMIN SERPL ELPH-MCNC: 4.2 G/DL
ALP BLD-CCNC: 85 U/L
ALT SERPL-CCNC: 10 U/L
ANION GAP SERPL CALC-SCNC: 10 MMOL/L
AST SERPL-CCNC: 16 U/L
BASOPHILS # BLD AUTO: 0.03 K/UL
BASOPHILS NFR BLD AUTO: 0.5 %
BILIRUB SERPL-MCNC: 0.4 MG/DL
BUN SERPL-MCNC: 27 MG/DL
CALCIUM SERPL-MCNC: 11.3 MG/DL
CHLORIDE SERPL-SCNC: 105 MMOL/L
CHOLEST SERPL-MCNC: 144 MG/DL
CO2 SERPL-SCNC: 24 MMOL/L
CREAT SERPL-MCNC: 1.72 MG/DL
EGFR: 39 ML/MIN/1.73M2
EOSINOPHIL # BLD AUTO: 0.07 K/UL
EOSINOPHIL NFR BLD AUTO: 1.1 %
ESTIMATED AVERAGE GLUCOSE: 94 MG/DL
GLUCOSE SERPL-MCNC: 73 MG/DL
HBA1C MFR BLD HPLC: 4.9 %
HCT VFR BLD CALC: 35.5 %
HDLC SERPL-MCNC: 51 MG/DL
HGB BLD-MCNC: 11.1 G/DL
IMM GRANULOCYTES NFR BLD AUTO: 0.2 %
LDLC SERPL CALC-MCNC: 63 MG/DL
LYMPHOCYTES # BLD AUTO: 1.53 K/UL
LYMPHOCYTES NFR BLD AUTO: 24.7 %
MAGNESIUM SERPL-MCNC: 2.2 MG/DL
MAN DIFF?: NORMAL
MCHC RBC-ENTMCNC: 31.3 GM/DL
MCHC RBC-ENTMCNC: 32.4 PG
MCV RBC AUTO: 103.5 FL
MONOCYTES # BLD AUTO: 0.47 K/UL
MONOCYTES NFR BLD AUTO: 7.6 %
NEUTROPHILS # BLD AUTO: 4.08 K/UL
NEUTROPHILS NFR BLD AUTO: 65.9 %
NONHDLC SERPL-MCNC: 93 MG/DL
PLATELET # BLD AUTO: 176 K/UL
POTASSIUM SERPL-SCNC: 5.3 MMOL/L
PROT SERPL-MCNC: 6.8 G/DL
PSA SERPL-MCNC: 4.78 NG/ML
RBC # BLD: 3.43 M/UL
RBC # FLD: 12.4 %
SODIUM SERPL-SCNC: 139 MMOL/L
TRIGL SERPL-MCNC: 149 MG/DL
TSH SERPL-ACNC: 1.39 UIU/ML
WBC # FLD AUTO: 6.19 K/UL

## 2022-12-06 ENCOUNTER — NON-APPOINTMENT (OUTPATIENT)
Age: 82
End: 2022-12-06

## 2022-12-12 ENCOUNTER — APPOINTMENT (OUTPATIENT)
Dept: CARDIOLOGY | Facility: CLINIC | Age: 82
End: 2022-12-12

## 2022-12-12 VITALS
WEIGHT: 179 LBS | SYSTOLIC BLOOD PRESSURE: 193 MMHG | HEART RATE: 78 BPM | OXYGEN SATURATION: 95 % | DIASTOLIC BLOOD PRESSURE: 71 MMHG | RESPIRATION RATE: 16 BRPM | HEIGHT: 73 IN | BODY MASS INDEX: 23.72 KG/M2

## 2022-12-12 VITALS — DIASTOLIC BLOOD PRESSURE: 58 MMHG | SYSTOLIC BLOOD PRESSURE: 162 MMHG

## 2022-12-12 PROCEDURE — 99214 OFFICE O/P EST MOD 30 MIN: CPT

## 2022-12-12 PROCEDURE — 93000 ELECTROCARDIOGRAM COMPLETE: CPT

## 2022-12-12 RX ORDER — CYCLOSPORINE 0.5 MG/ML
0.05 EMULSION OPHTHALMIC
Qty: 180 | Refills: 0 | Status: DISCONTINUED | COMMUNITY
Start: 2022-05-31 | End: 2022-12-12

## 2022-12-12 NOTE — ASSESSMENT
[FreeTextEntry1] : Echocardiogram November 18, 2022 demonstrated left ventricle normal size and function with ejection fraction of 60 to 65%.  Mild concentric LVH.  Grade 1 left ventricular diastolic dysfunction.  Mildly dilated left atrium, borderline dilated right atrium.  Trace mitral regurgitation, moderate aortic regurgitation and mild tricuspid regurgitation.  Mild dilatation of the ascending aorta to 4.02 cm.\par \par Carotid Doppler November 18, 2022 showed mild plaque with mild stenosis bilaterally.\par \par Nuclear stress test performed November 9, 2022 was a pharmacologic study which was tolerated well.  Blood pressure response to infusion was normal.  EKG showed no evidence of ischemia with infusion.  Evaluation of nuclear imaging showed no evidence of ischemia or infarct and ejection fraction of 57%.\par \par 82-year-old male with a past medical history of hypertension, dyslipidemia, chronic renal insufficiency, mild carotid disease who presents today for follow-up evaluation.  Cardiac testing is fairly unremarkable.  Stress testing shows no evidence of ischemia with a normal EF.  Echo shows a normal EF with mild LVH and mild biatrial enlargement.  Moderate aortic regurgitation is noted which is actually an improvement from a prior echo.  Aortic dilatation is unchanged.  Carotid shows mild bilateral disease which is also unchanged.  I most concerned regarding his blood pressure in the setting of his LVH and atrial enlargement as well as aortic enlargement.  I had considered the addition of chlorthalidone but given issues with his creatinine I will start nifedipine in addition to his losartan.  He does have issues with ankle swelling which appears possibly secondary to arthritis and some venous insufficiency.  If he has more swelling he will let me know.  He also needs to follow-up with nephrology.

## 2022-12-12 NOTE — PHYSICAL EXAM
[Well Developed] : well developed [Well Nourished] : well nourished [No Acute Distress] : no acute distress [Normal Conjunctiva] : normal conjunctiva [Normal Venous Pressure] : normal venous pressure [No Carotid Bruit] : no carotid bruit [Normal S1, S2] : normal S1, S2 [No Murmur] : no murmur [No Rub] : no rub [No Gallop] : no gallop [Clear Lung Fields] : clear lung fields [Good Air Entry] : good air entry [No Respiratory Distress] : no respiratory distress  [Soft] : abdomen soft [Non Tender] : non-tender [No Masses/organomegaly] : no masses/organomegaly [Normal Bowel Sounds] : normal bowel sounds [Normal Gait] : normal gait [No Cyanosis] : no cyanosis [No Clubbing] : no clubbing [No Varicosities] : no varicosities [Moves all extremities] : moves all extremities [No Focal Deficits] : no focal deficits [Normal Speech] : normal speech [Alert and Oriented] : alert and oriented [Normal memory] : normal memory [de-identified] : Tr R pedal edema, 1+ L pedal edema

## 2022-12-12 NOTE — DISCUSSION/SUMMARY
[FreeTextEntry1] : 1.  No additional cardiac testing at this time.\par 2.  Add nifedipine ER 30 mg daily for hypertension.\par 3.  Continue other current cardiac meds in doses as noted above for hypertension and dyslipidemia.\par 4.  Monitor BP at home, keep a log and bring to f/u.\par 5.  Follow-up with nephrology for his elevated creatinine and calcium.\par 6.  Encouraged to elevate legs and use compression stockings to help with edema.\par 7.  Follow-up in May when he returns from [place]. [EKG obtained to assist in diagnosis and management of assessed problem(s)] : EKG obtained to assist in diagnosis and management of assessed problem(s)

## 2022-12-12 NOTE — HISTORY OF PRESENT ILLNESS
[FreeTextEntry1] : Patient presents back today feeling better.  He is not having any further issues of unsteady gait or any other symptoms really at all.  His main concern is with swelling in his ankles and his feet which has been a chronic issue for many years.  He has had this worked up in the past with no real cause identified.  Blood pressures at home have been in the 120s to 140s over 60s to 70s but mostly over 130 systolic.  He is tolerating his medication right now without a problem.  He is in need of seeing a nephrologist because of issues with his creatinine and hypercalcemia.  Patient denies chest pain, shortness of breath, palpitations, orthopnea, presyncope, syncope.

## 2022-12-21 ENCOUNTER — APPOINTMENT (OUTPATIENT)
Dept: CARDIOLOGY | Facility: CLINIC | Age: 82
End: 2022-12-21

## 2023-02-16 RX ORDER — KIT FOR THE PREPARATION OF TECHNETIUM TC99M SESTAMIBI 1 MG/5ML
INJECTION, POWDER, LYOPHILIZED, FOR SOLUTION PARENTERAL
Refills: 0 | Status: COMPLETED | OUTPATIENT
Start: 2023-02-16

## 2023-02-16 RX ADMIN — KIT FOR THE PREPARATION OF TECHNETIUM TC99M SESTAMIBI 0: 1 INJECTION, POWDER, LYOPHILIZED, FOR SOLUTION PARENTERAL at 00:00

## 2023-05-30 ENCOUNTER — NON-APPOINTMENT (OUTPATIENT)
Age: 83
End: 2023-05-30

## 2023-05-30 ENCOUNTER — APPOINTMENT (OUTPATIENT)
Dept: CARDIOLOGY | Facility: CLINIC | Age: 83
End: 2023-05-30
Payer: MEDICARE

## 2023-05-30 VITALS
DIASTOLIC BLOOD PRESSURE: 58 MMHG | SYSTOLIC BLOOD PRESSURE: 126 MMHG | HEIGHT: 73 IN | WEIGHT: 179 LBS | BODY MASS INDEX: 23.72 KG/M2 | HEART RATE: 65 BPM | RESPIRATION RATE: 16 BRPM

## 2023-05-30 PROCEDURE — 99214 OFFICE O/P EST MOD 30 MIN: CPT

## 2023-05-30 PROCEDURE — 93000 ELECTROCARDIOGRAM COMPLETE: CPT

## 2023-05-30 NOTE — HISTORY OF PRESENT ILLNESS
[FreeTextEntry1] : Patient presents back today feeling generally well.  He still has edema which is unchanged.  He is having some issues with knee pains recently which she has had in the past but seem to bother him a little bit more now.  No other complaints at this time.  He has not been checking his blood pressure but tolerated the addition of nifedipine without a problem.  He said his blood pressure was checked once while he was in [place] and it was well controlled.  Patient denies chest pain, shortness of breath, palpitations, orthopnea, presyncope, syncope.

## 2023-05-30 NOTE — DISCUSSION/SUMMARY
[EKG obtained to assist in diagnosis and management of assessed problem(s)] : EKG obtained to assist in diagnosis and management of assessed problem(s) [FreeTextEntry1] : 1.  No additional cardiac testing at this time.\par 2.  Continue current cardiac meds in doses as noted above for hypertension and dyslipidemia.\par 3.  Monitor BP at home, keep a log and bring to f/u.\par 4.  Follow-up with his PCP and nephrology for his elevated creatinine and calcium.\par 5.  Encouraged to elevate legs and use compression stockings to help with edema.\par 6.  Patient is encouraged to exercise at least 30 minutes a day everyday of the week.\par 7.  Patient strongly encouraged on reducing salt intake.\par 8.  Follow up here in four months.

## 2023-05-30 NOTE — PHYSICAL EXAM
[Well Developed] : well developed [Well Nourished] : well nourished [No Acute Distress] : no acute distress [Normal Conjunctiva] : normal conjunctiva [Normal Venous Pressure] : normal venous pressure [No Carotid Bruit] : no carotid bruit [Normal S1, S2] : normal S1, S2 [No Rub] : no rub [No Gallop] : no gallop [Clear Lung Fields] : clear lung fields [Good Air Entry] : good air entry [No Respiratory Distress] : no respiratory distress  [Soft] : abdomen soft [Non Tender] : non-tender [No Masses/organomegaly] : no masses/organomegaly [Normal Bowel Sounds] : normal bowel sounds [Normal Gait] : normal gait [No Cyanosis] : no cyanosis [No Clubbing] : no clubbing [No Varicosities] : no varicosities [Moves all extremities] : moves all extremities [No Focal Deficits] : no focal deficits [Normal Speech] : normal speech [Alert and Oriented] : alert and oriented [Normal memory] : normal memory [de-identified] : 2/6 THERESE heard throughout the precordium [de-identified] : Tr R pedal edema, 1+ L pedal edema

## 2023-05-30 NOTE — ASSESSMENT
[FreeTextEntry1] : Echocardiogram November 18, 2022 demonstrated left ventricle normal size and function with ejection fraction of 60 to 65%.  Mild concentric LVH.  Grade 1 left ventricular diastolic dysfunction.  Mildly dilated left atrium, borderline dilated right atrium.  Trace mitral regurgitation, moderate aortic regurgitation and mild tricuspid regurgitation.  Mild dilatation of the ascending aorta to 4.02 cm.\par \par Carotid Doppler November 18, 2022 showed mild plaque with mild stenosis bilaterally.\par \par Nuclear stress test performed November 9, 2022 was a pharmacologic study which was tolerated well.  Blood pressure response to infusion was normal.  EKG showed no evidence of ischemia with infusion.  Evaluation of nuclear imaging showed no evidence of ischemia or infarct and ejection fraction of 57%.\par \par EKG: Sinus rhythm with nonspecific T-wave changes.\par \par 83-year-old male with a past medical history of hypertension, dyslipidemia, chronic renal insufficiency, mild carotid disease who presents today for follow-up evaluation.  Patient continues to well from a cardiac standpoint.  He tolerated the addition of nifedipine and his blood pressure seems to be much better.  Have asked him again to start checking it at home and he says he will do so.  He still has issues with edema likely from chronic venous insufficiency.  He tried stockings but they did not really help.  Have given him a prescription to get properly fitted stockings and to try wearing them to see if that helps.  He will continue to elevate his legs.  He never followed up with renal with regards to his chronic renal insufficiency and his hypercalcemia and he is again encouraged to do so.  EKG today is unchanged.

## 2023-06-09 ENCOUNTER — RX RENEWAL (OUTPATIENT)
Age: 83
End: 2023-06-09

## 2023-06-26 ENCOUNTER — RX RENEWAL (OUTPATIENT)
Age: 83
End: 2023-06-26

## 2023-07-19 ENCOUNTER — RX RENEWAL (OUTPATIENT)
Age: 83
End: 2023-07-19

## 2023-09-13 RX ORDER — NIFEDIPINE 30 MG/1
30 TABLET, EXTENDED RELEASE ORAL
Qty: 90 | Refills: 1 | Status: ACTIVE | COMMUNITY
Start: 2022-12-12 | End: 1900-01-01

## 2023-09-18 ENCOUNTER — APPOINTMENT (OUTPATIENT)
Dept: CARDIOLOGY | Facility: CLINIC | Age: 83
End: 2023-09-18
Payer: MEDICARE

## 2023-09-18 ENCOUNTER — NON-APPOINTMENT (OUTPATIENT)
Age: 83
End: 2023-09-18

## 2023-09-18 VITALS
DIASTOLIC BLOOD PRESSURE: 67 MMHG | BODY MASS INDEX: 23.19 KG/M2 | HEART RATE: 64 BPM | HEIGHT: 73 IN | WEIGHT: 175 LBS | SYSTOLIC BLOOD PRESSURE: 140 MMHG | RESPIRATION RATE: 16 BRPM

## 2023-09-18 PROCEDURE — 99214 OFFICE O/P EST MOD 30 MIN: CPT

## 2023-09-18 PROCEDURE — 93000 ELECTROCARDIOGRAM COMPLETE: CPT

## 2023-09-25 ENCOUNTER — RX RENEWAL (OUTPATIENT)
Age: 83
End: 2023-09-25

## 2023-10-01 LAB
ALBUMIN SERPL ELPH-MCNC: 4.6 G/DL
ALP BLD-CCNC: 89 U/L
ALT SERPL-CCNC: 9 U/L
ANION GAP SERPL CALC-SCNC: 10 MMOL/L
AST SERPL-CCNC: 21 U/L
BILIRUB SERPL-MCNC: 0.5 MG/DL
BUN SERPL-MCNC: 30 MG/DL
CALCIUM SERPL-MCNC: 11.1 MG/DL
CHLORIDE SERPL-SCNC: 104 MMOL/L
CHOLEST SERPL-MCNC: 143 MG/DL
CO2 SERPL-SCNC: 24 MMOL/L
CREAT SERPL-MCNC: 1.84 MG/DL
EGFR: 36 ML/MIN/1.73M2
ESTIMATED AVERAGE GLUCOSE: 91 MG/DL
GLUCOSE SERPL-MCNC: 85 MG/DL
HBA1C MFR BLD HPLC: 4.8 %
HCT VFR BLD CALC: 34.5 %
HDLC SERPL-MCNC: 54 MG/DL
HGB BLD-MCNC: 10.9 G/DL
LDLC SERPL CALC-MCNC: 70 MG/DL
MAGNESIUM SERPL-MCNC: 2.1 MG/DL
MCHC RBC-ENTMCNC: 31.6 GM/DL
MCHC RBC-ENTMCNC: 32.2 PG
MCV RBC AUTO: 101.8 FL
NONHDLC SERPL-MCNC: 89 MG/DL
PLATELET # BLD AUTO: 170 K/UL
POTASSIUM SERPL-SCNC: 4.7 MMOL/L
PROT SERPL-MCNC: 7 G/DL
RBC # BLD: 3.39 M/UL
RBC # FLD: 12.6 %
SODIUM SERPL-SCNC: 138 MMOL/L
TRIGL SERPL-MCNC: 100 MG/DL
WBC # FLD AUTO: 6.67 K/UL

## 2023-10-18 ENCOUNTER — RX RENEWAL (OUTPATIENT)
Age: 83
End: 2023-10-18

## 2023-11-16 ENCOUNTER — APPOINTMENT (OUTPATIENT)
Dept: FAMILY MEDICINE | Facility: CLINIC | Age: 83
End: 2023-11-16

## 2023-12-01 ENCOUNTER — OFFICE (OUTPATIENT)
Dept: URBAN - METROPOLITAN AREA CLINIC 115 | Facility: CLINIC | Age: 83
Setting detail: OPHTHALMOLOGY
End: 2023-12-01
Payer: MEDICARE

## 2023-12-01 DIAGNOSIS — H01.002: ICD-10-CM

## 2023-12-01 DIAGNOSIS — H04.122: ICD-10-CM

## 2023-12-01 DIAGNOSIS — H01.005: ICD-10-CM

## 2023-12-01 DIAGNOSIS — H35.033: ICD-10-CM

## 2023-12-01 DIAGNOSIS — H04.121: ICD-10-CM

## 2023-12-01 DIAGNOSIS — H04.123: ICD-10-CM

## 2023-12-01 DIAGNOSIS — H43.813: ICD-10-CM

## 2023-12-01 PROCEDURE — 83861 MICROFLUID ANALY TEARS: CPT | Mod: QW,RT | Performed by: OPHTHALMOLOGY

## 2023-12-01 PROCEDURE — 92014 COMPRE OPH EXAM EST PT 1/>: CPT | Performed by: OPHTHALMOLOGY

## 2023-12-01 PROCEDURE — 83861 MICROFLUID ANALY TEARS: CPT | Mod: QW,LT | Performed by: OPHTHALMOLOGY

## 2023-12-01 PROCEDURE — 92250 FUNDUS PHOTOGRAPHY W/I&R: CPT | Performed by: OPHTHALMOLOGY

## 2023-12-01 ASSESSMENT — REFRACTION_CURRENTRX
OD_VPRISM_DIRECTION: PROGS
OD_CYLINDER: -1.50
OD_OVR_VA: 20/
OS_VPRISM_DIRECTION: PROGS
OD_AXIS: 070
OS_ADD: +2.50
OD_CYLINDER: -1.50
OD_VPRISM_DIRECTION: PROGS
OS_OVR_VA: 20/
OD_OVR_VA: 20/
OS_VPRISM_DIRECTION: PROGS
OS_SPHERE: +1.25
OD_ADD: +2.50
OD_SPHERE: +0.50
OD_ADD: +3.00
OS_AXIS: 078
OS_OVR_VA: 20/
OS_ADD: +3.00
OS_CYLINDER: -1.00
OD_SPHERE: +2.50
OS_AXIS: 105
OS_SPHERE: +0.25
OD_AXIS: 100
OS_CYLINDER: -1.00

## 2023-12-01 ASSESSMENT — REFRACTION_MANIFEST
OD_SPHERE: +0.50
OD_AXIS: 63
OD_ADD: +1.75
OD_AXIS: 068
OS_SPHERE: +2.25
OS_ADD: +1.75
OD_CYLINDER: -1.25
OS_VA1: 20/25
OD_VA2: 20/20
OD_VA1: 20/30
OS_VA1: 20/20
OU_VA: 20/20-
OS_VA2: 20/20
OD_CYLINDER: -1.00
OS_CYLINDER: -2.25
OS_ADD: +3.25
OS_AXIS: 065
OS_VA1: 20/30
OD_CYLINDER: -2.00
OS_CYLINDER: -2.25
OD_VA1: 20/25
OS_CYLINDER: -1.50
OD_SPHERE: +2.00
OD_VA2: 20/20
OS_VA2: 20/20
OS_VA1: 20/20
OD_SPHERE: +1.75
OS_AXIS: 075
OS_ADD: +3.00
OS_AXIS: 097
OD_ADD: +3.00
OD_VA1: 20/20
OS_SPHERE: +2.00
OD_CYLINDER: -1.50
OD_AXIS: 071
OS_CYLINDER: -1.00
OD_SPHERE: +2.00
OS_SPHERE: PLANO
OD_VA1: 20/25
OD_AXIS: 066
OU_VA: 20/20
OS_AXIS: 074
OS_SPHERE: +2.50
OD_ADD: +3.25

## 2023-12-01 ASSESSMENT — REFRACTION_AUTOREFRACTION
OD_CYLINDER: -2.50
OS_CYLINDER: -1.00
OS_SPHERE: +0.50
OS_AXIS: 099
OD_SPHERE: +1.25
OD_AXIS: 082

## 2023-12-01 ASSESSMENT — SPHEQUIV_DERIVED
OD_SPHEQUIV: 1.125
OD_SPHEQUIV: 0
OS_SPHEQUIV: 0
OD_SPHEQUIV: 1
OS_SPHEQUIV: 1.25
OD_SPHEQUIV: 1.25
OS_SPHEQUIV: 1.125
OS_SPHEQUIV: 1.375
OD_SPHEQUIV: 0

## 2023-12-01 ASSESSMENT — SUPERFICIAL PUNCTATE KERATITIS (SPK)
OD_SPK: 1+ 2+
OS_SPK: 1+

## 2023-12-01 ASSESSMENT — CONFRONTATIONAL VISUAL FIELD TEST (CVF)
OD_FINDINGS: FULL
OS_FINDINGS: FULL

## 2023-12-01 ASSESSMENT — LID EXAM ASSESSMENTS
OS_BLEPHARITIS: LLL 1+
OD_BLEPHARITIS: RLL 1+

## 2023-12-13 ENCOUNTER — APPOINTMENT (OUTPATIENT)
Dept: CARDIOLOGY | Facility: CLINIC | Age: 83
End: 2023-12-13
Payer: MEDICARE

## 2023-12-13 ENCOUNTER — NON-APPOINTMENT (OUTPATIENT)
Age: 83
End: 2023-12-13

## 2023-12-13 VITALS
HEART RATE: 64 BPM | RESPIRATION RATE: 16 BRPM | SYSTOLIC BLOOD PRESSURE: 150 MMHG | OXYGEN SATURATION: 97 % | BODY MASS INDEX: 23.06 KG/M2 | DIASTOLIC BLOOD PRESSURE: 70 MMHG | WEIGHT: 174 LBS | HEIGHT: 73 IN

## 2023-12-13 DIAGNOSIS — R60.9 EDEMA, UNSPECIFIED: ICD-10-CM

## 2023-12-13 PROCEDURE — 99214 OFFICE O/P EST MOD 30 MIN: CPT

## 2023-12-13 PROCEDURE — 93000 ELECTROCARDIOGRAM COMPLETE: CPT

## 2023-12-13 RX ORDER — ASPIRIN 81 MG
81 TABLET, DELAYED RELEASE (ENTERIC COATED) ORAL
Refills: 0 | Status: ACTIVE | COMMUNITY

## 2023-12-13 NOTE — PHYSICAL EXAM
[Well Developed] : well developed [Well Nourished] : well nourished [No Acute Distress] : no acute distress [Normal Conjunctiva] : normal conjunctiva [Normal Venous Pressure] : normal venous pressure [No Carotid Bruit] : no carotid bruit [Normal S1, S2] : normal S1, S2 [No Rub] : no rub [No Gallop] : no gallop [Clear Lung Fields] : clear lung fields [Good Air Entry] : good air entry [No Respiratory Distress] : no respiratory distress  [Soft] : abdomen soft [Non Tender] : non-tender [No Masses/organomegaly] : no masses/organomegaly [Normal Bowel Sounds] : normal bowel sounds [Normal Gait] : normal gait [No Cyanosis] : no cyanosis [No Clubbing] : no clubbing [No Varicosities] : no varicosities [Moves all extremities] : moves all extremities [No Focal Deficits] : no focal deficits [Normal Speech] : normal speech [Alert and Oriented] : alert and oriented [Normal memory] : normal memory [de-identified] : 2/6 THERESE heard throughout the precordium [de-identified] : 1+ bilateral pedal edema

## 2023-12-13 NOTE — ASSESSMENT
[FreeTextEntry1] : Echocardiogram November 18, 2022 demonstrated left ventricle normal size and function with ejection fraction of 60 to 65%. Mild concentric LVH. Grade 1 left ventricular diastolic dysfunction. Mildly dilated left atrium, borderline dilated right atrium. Trace mitral regurgitation, moderate aortic regurgitation and mild tricuspid regurgitation. 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  Carotid Doppler November 18, 2022 showed mild plaque with mild stenosis bilaterally.  Nuclear stress test performed November 9, 2022 was a pharmacologic study which was tolerated well. Blood pressure response to infusion was normal. EKG showed no evidence of ischemia with infusion. Evaluation of nuclear imaging showed no evidence of ischemia or infarct and ejection fraction of 57%.  EKG: Sinus rhythm with borderline T-wave changes.  PVC noted.  83-year-old male with a past medical history of hypertension, dyslipidemia, chronic renal insufficiency, mild carotid disease who presents today for follow-up evaluation. Patient continues to well from a cardiac standpoint. Blood pressure appears to be controlled. He is still having issues with edema but this has remained very stable.  EKG is unchanged.  No need to make any changes to his medication for today.  He is encouraged to continue elevating his legs and using stockings.  He may benefit from tighter fitting stockings and he will consider that.  He never followed up with regards to a nephrologist but his renal function has been stable.  He is seeing his primary next week.  His blood work is otherwise unremarkable with good control of his lipids and no evidence of diabetes.

## 2023-12-13 NOTE — HISTORY OF PRESENT ILLNESS
[FreeTextEntry1] : Patient presents back to the office today feeling fairly well.  His edema has about the same and still mostly just in his feet.  Blood pressures have been in the 130s over 70s.  He continues to tolerate his medication without a problem.  He has remained active without any symptoms or limitations.  Patient denies chest pain, shortness of breath, palpitations, orthopnea, presyncope, syncope.

## 2023-12-13 NOTE — DISCUSSION/SUMMARY
[FreeTextEntry1] : 1.  No additional cardiac testing at this time. 2.  Continue current cardiac meds in doses as noted above for hypertension and dyslipidemia. 3.  Monitor BP at home, keep a log and bring to f/u. 4.  Follow-up with his PCP and consider nephrology for his elevated creatinine and calcium.  5.  Encouraged to elevate legs and use compression stockings to help with edema.   6.  Patient is encouraged to exercise at least 30 minutes a day everyday of the week. 7.  Patient strongly encouraged on reducing salt intake. 8.  Follow up here in 6 months when he returns from [place]. [EKG obtained to assist in diagnosis and management of assessed problem(s)] : EKG obtained to assist in diagnosis and management of assessed problem(s)

## 2023-12-22 ENCOUNTER — APPOINTMENT (OUTPATIENT)
Dept: INTERNAL MEDICINE | Facility: CLINIC | Age: 83
End: 2023-12-22
Payer: MEDICARE

## 2023-12-22 VITALS
HEART RATE: 71 BPM | HEIGHT: 73 IN | SYSTOLIC BLOOD PRESSURE: 136 MMHG | WEIGHT: 174 LBS | DIASTOLIC BLOOD PRESSURE: 73 MMHG | BODY MASS INDEX: 23.06 KG/M2

## 2023-12-22 DIAGNOSIS — M25.572 PAIN IN LEFT ANKLE AND JOINTS OF LEFT FOOT: ICD-10-CM

## 2023-12-22 DIAGNOSIS — Z00.00 ENCOUNTER FOR GENERAL ADULT MEDICAL EXAMINATION W/OUT ABNORMAL FINDINGS: ICD-10-CM

## 2023-12-22 DIAGNOSIS — Z72.0 TOBACCO USE: ICD-10-CM

## 2023-12-22 DIAGNOSIS — Z71.6 TOBACCO ABUSE COUNSELING: ICD-10-CM

## 2023-12-22 DIAGNOSIS — J30.9 ALLERGIC RHINITIS, UNSPECIFIED: ICD-10-CM

## 2023-12-22 PROCEDURE — G0439: CPT

## 2023-12-22 PROCEDURE — 36415 COLL VENOUS BLD VENIPUNCTURE: CPT

## 2023-12-22 RX ORDER — DICLOFENAC SODIUM 1% 10 MG/G
1 GEL TOPICAL
Qty: 100 | Refills: 0 | Status: ACTIVE | COMMUNITY
Start: 2020-11-16 | End: 1900-01-01

## 2023-12-22 NOTE — HEALTH RISK ASSESSMENT
[Good] : ~his/her~  mood as  good [0] : 2) Feeling down, depressed, or hopeless: Not at all (0) [PHQ-2 Negative - No further assessment needed] : PHQ-2 Negative - No further assessment needed [[id number]] : 0 [HIV test declined] : HIV test declined [Hepatitis C test declined] : Hepatitis C test declined [Current] : Current

## 2023-12-22 NOTE — PHYSICAL EXAM
[No Acute Distress] : no acute distress [Well Nourished] : well nourished [Well Developed] : well developed [Well-Appearing] : well-appearing [Normal Sclera/Conjunctiva] : normal sclera/conjunctiva [PERRL] : pupils equal round and reactive to light [EOMI] : extraocular movements intact [Normal Outer Ear/Nose] : the outer ears and nose were normal in appearance [Normal Oropharynx] : the oropharynx was normal [No JVD] : no jugular venous distention [No Lymphadenopathy] : no lymphadenopathy [Supple] : supple [No Respiratory Distress] : no respiratory distress  [No Accessory Muscle Use] : no accessory muscle use [Clear to Auscultation] : lungs were clear to auscultation bilaterally [Normal Rate] : normal rate  [Regular Rhythm] : with a regular rhythm [Normal S1, S2] : normal S1 and S2 [No Carotid Bruits] : no carotid bruits [No Abdominal Bruit] : a ~M bruit was not heard ~T in the abdomen [No Varicosities] : no varicosities [No Palpable Aorta] : no palpable aorta [No Edema] : there was no peripheral edema [No Extremity Clubbing/Cyanosis] : no extremity clubbing/cyanosis [Soft] : abdomen soft [Non Tender] : non-tender [Non-distended] : non-distended [No HSM] : no HSM [Normal Bowel Sounds] : normal bowel sounds [Normal Posterior Cervical Nodes] : no posterior cervical lymphadenopathy [Normal Anterior Cervical Nodes] : no anterior cervical lymphadenopathy [No CVA Tenderness] : no CVA  tenderness [No Spinal Tenderness] : no spinal tenderness [No Joint Swelling] : no joint swelling [Grossly Normal Strength/Tone] : grossly normal strength/tone [No Rash] : no rash [Coordination Grossly Intact] : coordination grossly intact [No Focal Deficits] : no focal deficits [Normal Gait] : normal gait [Normal Affect] : the affect was normal [Normal Insight/Judgement] : insight and judgment were intact

## 2023-12-22 NOTE — HISTORY OF PRESENT ILLNESS
[FreeTextEntry1] : physical exam  [de-identified] : Mr. DESIRAE OAKLEY is a 83 year male with a PMH of HTN, HLD, comes to the office for physical exam. Patient feels well and has no complaints at this time.

## 2023-12-22 NOTE — PLAN
[FreeTextEntry1] : In regards to patients Physical exam, routine blood work drawn, will review results with patient.    HTN- BP well controlled with Losartan 100 mg PO QD.  Patient will continue to follow with Cardiologist   Patient smokes 1-2 cigarettes a day, patient is not ready to quit.   CKD- patient referred to Nephrologist  Counseling included abnormal lab results, differential diagnoses, treatment options, risks and benefits, lifestyle changes, prognosis, current condition, medications, and dose adjustments.  The patient was interactive, attentive, asked questions, and verbalized understanding

## 2023-12-26 ENCOUNTER — NON-APPOINTMENT (OUTPATIENT)
Age: 83
End: 2023-12-26

## 2023-12-26 LAB
25(OH)D3 SERPL-MCNC: 26 NG/ML
ALBUMIN SERPL ELPH-MCNC: 4.8 G/DL
ALP BLD-CCNC: 91 U/L
ALT SERPL-CCNC: 11 U/L
ANION GAP SERPL CALC-SCNC: 10 MMOL/L
AST SERPL-CCNC: 17 U/L
BASOPHILS # BLD AUTO: 0.02 K/UL
BASOPHILS NFR BLD AUTO: 0.3 %
BILIRUB SERPL-MCNC: 0.4 MG/DL
BUN SERPL-MCNC: 31 MG/DL
CALCIUM SERPL-MCNC: 11.6 MG/DL
CHLORIDE SERPL-SCNC: 106 MMOL/L
CHOLEST SERPL-MCNC: 144 MG/DL
CO2 SERPL-SCNC: 25 MMOL/L
CREAT SERPL-MCNC: 1.86 MG/DL
EGFR: 35 ML/MIN/1.73M2
EOSINOPHIL # BLD AUTO: 0.11 K/UL
EOSINOPHIL NFR BLD AUTO: 1.7 %
ESTIMATED AVERAGE GLUCOSE: 85 MG/DL
FOLATE SERPL-MCNC: 4 NG/ML
GLUCOSE SERPL-MCNC: 72 MG/DL
HBA1C MFR BLD HPLC: 4.6 %
HCT VFR BLD CALC: 35.3 %
HDLC SERPL-MCNC: 55 MG/DL
HGB BLD-MCNC: 11.2 G/DL
IMM GRANULOCYTES NFR BLD AUTO: 0.2 %
IRON SATN MFR SERPL: 38 %
IRON SERPL-MCNC: 84 UG/DL
LDLC SERPL CALC-MCNC: 74 MG/DL
LYMPHOCYTES # BLD AUTO: 1.56 K/UL
LYMPHOCYTES NFR BLD AUTO: 24.1 %
MAGNESIUM SERPL-MCNC: 2.1 MG/DL
MAN DIFF?: NORMAL
MCHC RBC-ENTMCNC: 31.7 GM/DL
MCHC RBC-ENTMCNC: 32.8 PG
MCV RBC AUTO: 103.5 FL
MONOCYTES # BLD AUTO: 0.43 K/UL
MONOCYTES NFR BLD AUTO: 6.6 %
NEUTROPHILS # BLD AUTO: 4.35 K/UL
NEUTROPHILS NFR BLD AUTO: 67.1 %
NONHDLC SERPL-MCNC: 89 MG/DL
PLATELET # BLD AUTO: 184 K/UL
POTASSIUM SERPL-SCNC: 4.7 MMOL/L
PROT SERPL-MCNC: 7.2 G/DL
PSA SERPL-MCNC: 5.26 NG/ML
RBC # BLD: 3.41 M/UL
RBC # FLD: 12.4 %
SODIUM SERPL-SCNC: 141 MMOL/L
TIBC SERPL-MCNC: 218 UG/DL
TRIGL SERPL-MCNC: 76 MG/DL
TSH SERPL-ACNC: 1.71 UIU/ML
UIBC SERPL-MCNC: 134 UG/DL
VIT B12 SERPL-MCNC: 220 PG/ML
WBC # FLD AUTO: 6.48 K/UL

## 2023-12-29 ENCOUNTER — RX RENEWAL (OUTPATIENT)
Age: 83
End: 2023-12-29

## 2023-12-29 RX ORDER — CYANOCOBALAMIN (VITAMIN B-12) 100 MCG
100 TABLET ORAL
Qty: 90 | Refills: 0 | Status: ACTIVE | COMMUNITY
Start: 2019-07-15 | End: 1900-01-01

## 2024-01-15 ENCOUNTER — RX RENEWAL (OUTPATIENT)
Age: 84
End: 2024-01-15

## 2024-01-16 ENCOUNTER — RX CHANGE (OUTPATIENT)
Age: 84
End: 2024-01-16

## 2024-01-16 RX ORDER — FLUTICASONE PROPIONATE 50 UG/1
50 SPRAY, METERED NASAL
Qty: 16 | Refills: 3 | Status: DISCONTINUED | COMMUNITY
Start: 2021-09-20 | End: 2024-01-16

## 2024-01-16 RX ORDER — FLUTICASONE PROPIONATE 50 UG/1
50 SPRAY, METERED NASAL
Qty: 48 | Refills: 2 | Status: ACTIVE | COMMUNITY
Start: 1900-01-01 | End: 1900-01-01

## 2024-04-16 ENCOUNTER — RX RENEWAL (OUTPATIENT)
Age: 84
End: 2024-04-16

## 2024-04-16 RX ORDER — LOSARTAN POTASSIUM 100 MG/1
100 TABLET, FILM COATED ORAL
Qty: 90 | Refills: 3 | Status: ACTIVE | COMMUNITY
Start: 2017-05-18 | End: 1900-01-01

## 2024-04-29 RX ORDER — ATORVASTATIN CALCIUM 40 MG/1
40 TABLET, FILM COATED ORAL
Qty: 90 | Refills: 0 | Status: ACTIVE | COMMUNITY
Start: 2017-01-19 | End: 1900-01-01

## 2024-05-20 ENCOUNTER — APPOINTMENT (OUTPATIENT)
Dept: CARDIOLOGY | Facility: CLINIC | Age: 84
End: 2024-05-20
Payer: MEDICARE

## 2024-05-20 ENCOUNTER — NON-APPOINTMENT (OUTPATIENT)
Age: 84
End: 2024-05-20

## 2024-05-20 VITALS
WEIGHT: 171 LBS | SYSTOLIC BLOOD PRESSURE: 122 MMHG | HEIGHT: 73 IN | HEART RATE: 77 BPM | RESPIRATION RATE: 16 BRPM | DIASTOLIC BLOOD PRESSURE: 60 MMHG | BODY MASS INDEX: 22.66 KG/M2

## 2024-05-20 DIAGNOSIS — R76.8 OTHER SPECIFIED ABNORMAL IMMUNOLOGICAL FINDINGS IN SERUM: ICD-10-CM

## 2024-05-20 DIAGNOSIS — I49.3 VENTRICULAR PREMATURE DEPOLARIZATION: ICD-10-CM

## 2024-05-20 DIAGNOSIS — E83.52 HYPERCALCEMIA: ICD-10-CM

## 2024-05-20 PROCEDURE — 99215 OFFICE O/P EST HI 40 MIN: CPT

## 2024-05-20 PROCEDURE — 93000 ELECTROCARDIOGRAM COMPLETE: CPT

## 2024-05-20 PROCEDURE — G2211 COMPLEX E/M VISIT ADD ON: CPT

## 2024-05-20 NOTE — PHYSICAL EXAM
[Well Developed] : well developed [Well Nourished] : well nourished [No Acute Distress] : no acute distress [Normal Conjunctiva] : normal conjunctiva [Normal Venous Pressure] : normal venous pressure [No Carotid Bruit] : no carotid bruit [Normal S1, S2] : normal S1, S2 [No Rub] : no rub [No Gallop] : no gallop [Clear Lung Fields] : clear lung fields [Good Air Entry] : good air entry [No Respiratory Distress] : no respiratory distress  [Soft] : abdomen soft [Non Tender] : non-tender [No Masses/organomegaly] : no masses/organomegaly [Normal Bowel Sounds] : normal bowel sounds [Normal Gait] : normal gait [No Cyanosis] : no cyanosis [No Clubbing] : no clubbing [No Varicosities] : no varicosities [Moves all extremities] : moves all extremities [No Focal Deficits] : no focal deficits [Normal Speech] : normal speech [Alert and Oriented] : alert and oriented [Normal memory] : normal memory [de-identified] : 2/6 THERESE heard throughout the precordium [de-identified] : 1+ bilateral pedal edema

## 2024-05-20 NOTE — DISCUSSION/SUMMARY
[FreeTextEntry1] : 1.  Check echocardiogram to evaluate for possible cardiac amyloid given the elevated free light chains and renal dysfunction. 2.  Continue current cardiac meds in doses as noted above for hypertension and dyslipidemia including atorvastatin 40 mg daily.. 3.  Monitor BP at home, keep a log and bring to f/u. 4.  I have given him the information for hematology to be evaluated for the elevated free light chains.  He will also see endocrinology regarding possible hyperparathyroidism.  Follow-up with nephrology and his primary as well. 5.  Encouraged to elevate legs and use compression stockings to help with edema.   6.  Patient is encouraged to exercise at least 30 minutes a day everyday of the week. 7.  Patient strongly encouraged on reducing salt intake. 8.  Follow up here in three months. [EKG obtained to assist in diagnosis and management of assessed problem(s)] : EKG obtained to assist in diagnosis and management of assessed problem(s)

## 2024-05-20 NOTE — ASSESSMENT
[FreeTextEntry1] : Echocardiogram November 18, 2022 demonstrated left ventricle normal size and function with ejection fraction of 60 to 65%. Mild concentric LVH. Grade 1 left ventricular diastolic dysfunction. Mildly dilated left atrium, borderline dilated right atrium. Trace mitral regurgitation, moderate aortic regurgitation and mild tricuspid regurgitation. 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  Carotid Doppler November 18, 2022 showed mild plaque with mild stenosis bilaterally.  Nuclear stress test performed November 9, 2022 was a pharmacologic study which was tolerated well. Blood pressure response to infusion was normal. EKG showed no evidence of ischemia with infusion. Evaluation of nuclear imaging showed no evidence of ischemia or infarct and ejection fraction of 57%.  EKG: Sinus rhythm with borderline T-wave changes.  PVCs noted.  84-year-old male with a past medical history of hypertension, dyslipidemia, chronic renal insufficiency, mild carotid disease who presents today for follow-up evaluation. Patient appears reasonably stable from a cardiac standpoint but there are significant concerns.  1 is with his recent resistant UTI and he is going to see urology next week.  He also has hypercalcemia and possible hyperparathyroidism.  He needs to follow-up with hematology and endocrinology.  He was noted to have elevated free light chains on the blood work from his nephrologist as well.  The combination of elevated free light chains along with renal dysfunction certainly opens up the possibility of amyloidosis.  His most recent echo did not suggest that but I will repeat the echo.  He does not show any evidence of heart failure.  Blood pressure appears to be well-controlled.  He will follow-up with hematology and endocrinology and then I will consider additional workup for possible cardiac amyloid depending on the findings of his echo.

## 2024-05-20 NOTE — HISTORY OF PRESENT ILLNESS
[FreeTextEntry1] : Patient presents today having returned recently from [place] where he was for the winter.  He reports having had an episode in [place] where became very lethargic and confused for several days.  There was also blood noted in the toilet.  He refused to go to the hospital despite his wife's imploring him to do so.  He eventually went to a medical doctor who diagnosed with UTI and gave him antibiotics.  He ultimately ended up on 2 more rounds of antibiotics and this finally seems to be improving.  He is going to see urology next week.  He followed up with a nephrologist down there who told him to follow-up with a hematologist because of some abnormal blood work including his high calcium but he never followed up with those doctors.  Symptomatically at this time he is feeling reasonably well.  Patient denies chest pain, shortness of breath, palpitations, orthopnea, presyncope, syncope.

## 2024-06-05 ENCOUNTER — APPOINTMENT (OUTPATIENT)
Dept: INTERNAL MEDICINE | Facility: CLINIC | Age: 84
End: 2024-06-05
Payer: MEDICARE

## 2024-06-05 VITALS
HEIGHT: 73 IN | SYSTOLIC BLOOD PRESSURE: 129 MMHG | WEIGHT: 171 LBS | HEART RATE: 71 BPM | BODY MASS INDEX: 22.66 KG/M2 | DIASTOLIC BLOOD PRESSURE: 51 MMHG

## 2024-06-05 DIAGNOSIS — R79.9 ABNORMAL FINDING OF BLOOD CHEMISTRY, UNSPECIFIED: ICD-10-CM

## 2024-06-05 DIAGNOSIS — N40.0 BENIGN PROSTATIC HYPERPLASIA WITHOUT LOWER URINARY TRACT SYMPMS: ICD-10-CM

## 2024-06-05 DIAGNOSIS — E78.5 HYPERLIPIDEMIA, UNSPECIFIED: ICD-10-CM

## 2024-06-05 DIAGNOSIS — R53.83 OTHER FATIGUE: ICD-10-CM

## 2024-06-05 DIAGNOSIS — N18.30 CHRONIC KIDNEY DISEASE, STAGE 3 UNSPECIFIED: ICD-10-CM

## 2024-06-05 DIAGNOSIS — I10 ESSENTIAL (PRIMARY) HYPERTENSION: ICD-10-CM

## 2024-06-05 DIAGNOSIS — E55.9 VITAMIN D DEFICIENCY, UNSPECIFIED: ICD-10-CM

## 2024-06-05 PROCEDURE — 99214 OFFICE O/P EST MOD 30 MIN: CPT

## 2024-06-05 PROCEDURE — 36415 COLL VENOUS BLD VENIPUNCTURE: CPT

## 2024-06-05 NOTE — PHYSICAL EXAM
[No Acute Distress] : no acute distress [Well Nourished] : well nourished [Well Developed] : well developed [Normal Sclera/Conjunctiva] : normal sclera/conjunctiva [Well-Appearing] : well-appearing [PERRL] : pupils equal round and reactive to light [EOMI] : extraocular movements intact [Normal Outer Ear/Nose] : the outer ears and nose were normal in appearance [Normal Oropharynx] : the oropharynx was normal [No JVD] : no jugular venous distention [No Lymphadenopathy] : no lymphadenopathy [Supple] : supple [No Respiratory Distress] : no respiratory distress  [No Accessory Muscle Use] : no accessory muscle use [Clear to Auscultation] : lungs were clear to auscultation bilaterally [Normal Rate] : normal rate  [Regular Rhythm] : with a regular rhythm [Normal S1, S2] : normal S1 and S2 [No Carotid Bruits] : no carotid bruits [No Abdominal Bruit] : a ~M bruit was not heard ~T in the abdomen [No Varicosities] : no varicosities [No Edema] : there was no peripheral edema [No Palpable Aorta] : no palpable aorta [No Extremity Clubbing/Cyanosis] : no extremity clubbing/cyanosis [Soft] : abdomen soft [Non Tender] : non-tender [Non-distended] : non-distended [No HSM] : no HSM [Normal Bowel Sounds] : normal bowel sounds [Normal Posterior Cervical Nodes] : no posterior cervical lymphadenopathy [Normal Anterior Cervical Nodes] : no anterior cervical lymphadenopathy [No CVA Tenderness] : no CVA  tenderness [No Spinal Tenderness] : no spinal tenderness [No Joint Swelling] : no joint swelling [Grossly Normal Strength/Tone] : grossly normal strength/tone [No Rash] : no rash [Coordination Grossly Intact] : coordination grossly intact [No Focal Deficits] : no focal deficits [Normal Gait] : normal gait [Normal Affect] : the affect was normal [Normal Insight/Judgement] : insight and judgment were intact

## 2024-06-05 NOTE — PLAN
[FreeTextEntry1] : History of elevated PSA- Patient will continue to follow with Urologist Dr. Nice.   HTN- BP well controlled with Losartan 100 mg PO QD.  Patient will continue to follow with Cardiologist   patient states that he quit smoking 11/2023 CKD- patient will follow with his Nephrologist Dr. Macias.  Prior to appointment and during encounter with patient extensive medical records were reviewed including but not limited to, Hospital records, out patient records, laboratory data and microbiology data In addition extensive time was also spent in reviewing diagnostic studies.   Total encounter total time 30 mins >50% of time spent counseling/coordinating care  Counseling included abnormal lab results, differential diagnoses, treatment options, risks and benefits, lifestyle changes, prognosis, current condition, medications, and dose adjustments.  The patient was interactive, attentive, asked questions, and verbalized understanding

## 2024-06-05 NOTE — HEALTH RISK ASSESSMENT
[Good] : ~his/her~  mood as  good [0] : 2) Feeling down, depressed, or hopeless: Not at all (0) [PHQ-2 Negative - No further assessment needed] : PHQ-2 Negative - No further assessment needed [Former] : Former [20 or more] : 20 or more [< 15 Years] : < 15 Years [HIV test declined] : HIV test declined [Hepatitis C test declined] : Hepatitis C test declined [[id number]] : 0 [de-identified] : quit 11/23

## 2024-06-05 NOTE — HISTORY OF PRESENT ILLNESS
[FreeTextEntry1] : follow up chronic medical conditions.  [de-identified] : Mr. DESIRAE OAKLEY is a 84 year male with a PMH of HTN, HLD, comes to the office for follow up chronic medical conditions. Patient feels well and has no complaints at this time.

## 2024-06-06 DIAGNOSIS — D64.9 ANEMIA, UNSPECIFIED: ICD-10-CM

## 2024-06-06 LAB
25(OH)D3 SERPL-MCNC: 26.8 NG/ML
ALBUMIN SERPL ELPH-MCNC: 4.2 G/DL
ALP BLD-CCNC: 81 U/L
ALT SERPL-CCNC: 11 U/L
ANION GAP SERPL CALC-SCNC: 10 MMOL/L
AST SERPL-CCNC: 13 U/L
BASOPHILS # BLD AUTO: 0.03 K/UL
BASOPHILS NFR BLD AUTO: 0.4 %
BILIRUB SERPL-MCNC: 0.4 MG/DL
BUN SERPL-MCNC: 33 MG/DL
CALCIUM SERPL-MCNC: 11.2 MG/DL
CHLORIDE SERPL-SCNC: 105 MMOL/L
CHOLEST SERPL-MCNC: 133 MG/DL
CO2 SERPL-SCNC: 22 MMOL/L
CREAT SERPL-MCNC: 1.82 MG/DL
EGFR: 36 ML/MIN/1.73M2
EOSINOPHIL # BLD AUTO: 0.06 K/UL
EOSINOPHIL NFR BLD AUTO: 0.9 %
ESTIMATED AVERAGE GLUCOSE: 94 MG/DL
FOLATE SERPL-MCNC: 4.4 NG/ML
GLUCOSE SERPL-MCNC: 92 MG/DL
HBA1C MFR BLD HPLC: 4.9 %
HCT VFR BLD CALC: 30.4 %
HDLC SERPL-MCNC: 46 MG/DL
HGB BLD-MCNC: 9.6 G/DL
IMM GRANULOCYTES NFR BLD AUTO: 0.3 %
IRON SATN MFR SERPL: 34 %
IRON SERPL-MCNC: 60 UG/DL
LDLC SERPL CALC-MCNC: 73 MG/DL
LYMPHOCYTES # BLD AUTO: 1.33 K/UL
LYMPHOCYTES NFR BLD AUTO: 19 %
MAGNESIUM SERPL-MCNC: 2.1 MG/DL
MAN DIFF?: NORMAL
MCHC RBC-ENTMCNC: 31.4 PG
MCHC RBC-ENTMCNC: 31.6 GM/DL
MCV RBC AUTO: 99.3 FL
MONOCYTES # BLD AUTO: 0.48 K/UL
MONOCYTES NFR BLD AUTO: 6.8 %
NEUTROPHILS # BLD AUTO: 5.09 K/UL
NEUTROPHILS NFR BLD AUTO: 72.6 %
NONHDLC SERPL-MCNC: 87 MG/DL
PLATELET # BLD AUTO: 242 K/UL
POTASSIUM SERPL-SCNC: 5.2 MMOL/L
PROT SERPL-MCNC: 7.1 G/DL
PSA SERPL-MCNC: 5.6 NG/ML
RBC # BLD: 3.06 M/UL
RBC # FLD: 12.7 %
SODIUM SERPL-SCNC: 137 MMOL/L
TIBC SERPL-MCNC: 174 UG/DL
TRIGL SERPL-MCNC: 67 MG/DL
TSH SERPL-ACNC: 1.49 UIU/ML
UIBC SERPL-MCNC: 114 UG/DL
VIT B12 SERPL-MCNC: 360 PG/ML
WBC # FLD AUTO: 7.01 K/UL

## 2024-06-26 ENCOUNTER — APPOINTMENT (OUTPATIENT)
Dept: CARDIOLOGY | Facility: CLINIC | Age: 84
End: 2024-06-26
Payer: MEDICARE

## 2024-06-26 PROCEDURE — 93306 TTE W/DOPPLER COMPLETE: CPT

## 2024-08-05 ENCOUNTER — APPOINTMENT (OUTPATIENT)
Dept: CARDIOLOGY | Facility: CLINIC | Age: 84
End: 2024-08-05

## 2024-08-05 ENCOUNTER — NON-APPOINTMENT (OUTPATIENT)
Age: 84
End: 2024-08-05

## 2024-08-05 PROCEDURE — 93000 ELECTROCARDIOGRAM COMPLETE: CPT

## 2024-08-05 PROCEDURE — 99214 OFFICE O/P EST MOD 30 MIN: CPT

## 2024-08-05 PROCEDURE — G2211 COMPLEX E/M VISIT ADD ON: CPT

## 2024-08-05 NOTE — PHYSICAL EXAM
[Well Developed] : well developed [Well Nourished] : well nourished [No Acute Distress] : no acute distress [Normal Conjunctiva] : normal conjunctiva [Normal Venous Pressure] : normal venous pressure [No Carotid Bruit] : no carotid bruit [Normal S1, S2] : normal S1, S2 [No Rub] : no rub [No Gallop] : no gallop [Clear Lung Fields] : clear lung fields [Good Air Entry] : good air entry [No Respiratory Distress] : no respiratory distress  [Soft] : abdomen soft [Non Tender] : non-tender [No Masses/organomegaly] : no masses/organomegaly [Normal Bowel Sounds] : normal bowel sounds [Normal Gait] : normal gait [No Cyanosis] : no cyanosis [No Clubbing] : no clubbing [No Varicosities] : no varicosities [Moves all extremities] : moves all extremities [No Focal Deficits] : no focal deficits [Normal Speech] : normal speech [Alert and Oriented] : alert and oriented [Normal memory] : normal memory [de-identified] : 2/6 THERESE heard throughout the precordium [de-identified] : 1-2+ bilateral pretibial edema

## 2024-08-05 NOTE — ASSESSMENT
[FreeTextEntry1] : Echocardiogram November 18, 2022 demonstrated left ventricle normal size and function with ejection fraction of 60 to 65%. Mild concentric LVH. Grade 1 left ventricular diastolic dysfunction. Mildly dilated left atrium, borderline dilated right atrium. Trace mitral regurgitation, moderate aortic regurgitation and mild tricuspid regurgitation. 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  Carotid Doppler November 18, 2022 showed mild plaque with mild stenosis bilaterally.  Nuclear stress test performed November 9, 2022 was a pharmacologic study which was tolerated well. Blood pressure response to infusion was normal. EKG showed no evidence of ischemia with infusion. Evaluation of nuclear imaging showed no evidence of ischemia or infarct and ejection fraction of 57%.  Echocardiogram June 26, 2024 demonstrated left ventricle normal size and function with ejection fraction of 65 to 70%.  Normal LV wall thickness.  Mildly dilated left atrium with normal right atrial size.  Trace mitral, moderate aortic and trace tricuspid regurgitation.  No pericardial effusion.  EKG: Sinus rhythm with borderline T-wave changes.  PVC noted.  84-year-old male with a past medical history of hypertension, dyslipidemia, chronic renal insufficiency, mild carotid disease who presents today for follow-up evaluation. Patient appears reasonably stable from a cardiac standpoint. His edema appears to be stable.  I once again encouraged him to elevate his legs and try using stockings regularly to see if this helps.  He feels stockings have not helped very much in the past but he will try again.  His echocardiogram shows normal EF with no significant features to be consistent with amyloidosis.  I do not believe additional workup is needed unless a diagnosis of amyloid is actually made.  He is awaiting the results of a bone marrow biopsy and that certainly could be informative.  He still has elevated free light chains as well as elevated calcium and renal dysfunction that could be consistent with amyloid or possibly multiple myeloma or another cause.  He is going to discuss this with a hematologist.  I have also once again encouraged him to follow with his primary and endocrinologist regarding his calcium.  Blood pressure seems to be a little high.  He has not been checking it at home.  Have asked him to get back into checking it at home and we will reconsider adjustments to his medication when he comes for follow-up.

## 2024-08-05 NOTE — DISCUSSION/SUMMARY
[FreeTextEntry1] : 1.  No additional cardiac testing at this time. 2.  Continue current cardiac meds in doses as noted above for hypertension and dyslipidemia including atorvastatin 40 mg daily. 3.  Monitor BP at home, keep a log and bring to f/u. 4.  Follow-up with hematology for the results of his blood work and bone marrow biopsy and to consider amyloidosis or multiple myeloma or other diagnoses for his combination of elevated free light chains, hypercalcemia and renal dysfunction.  Also once again encouraged him to consider endocrinology for his hypercalcemia. 5.  Encouraged to elevate legs and use compression stockings to help with edema.   6.  Patient is encouraged to exercise at least 30 minutes a day everyday of the week. 7.  Patient strongly encouraged on reducing salt intake. 8.  Follow up here in three months. [EKG obtained to assist in diagnosis and management of assessed problem(s)] : EKG obtained to assist in diagnosis and management of assessed problem(s)

## 2024-08-05 NOTE — HISTORY OF PRESENT ILLNESS
[FreeTextEntry1] : Patient presents back to the office today having followed up with hematology and undergoing a lot of blood work and now a bone marrow biopsy last week.  He is awaiting the results of all of that.  Symptomatically he still reports that his edema is up-and-down.  He has not very good about elevating his legs and has not been wearing stockings at all.  Edema has not really changed at all.  He does not report any other new symptoms.  Patient denies chest pain, shortness of breath, palpitations, orthopnea, presyncope, syncope.

## 2024-08-26 ENCOUNTER — RX RENEWAL (OUTPATIENT)
Age: 84
End: 2024-08-26

## 2024-09-12 ENCOUNTER — NON-APPOINTMENT (OUTPATIENT)
Age: 84
End: 2024-09-12

## 2024-09-24 ENCOUNTER — APPOINTMENT (OUTPATIENT)
Dept: INTERNAL MEDICINE | Facility: CLINIC | Age: 84
End: 2024-09-24
Payer: MEDICARE

## 2024-09-24 VITALS
DIASTOLIC BLOOD PRESSURE: 50 MMHG | BODY MASS INDEX: 22.8 KG/M2 | WEIGHT: 172 LBS | HEIGHT: 73 IN | SYSTOLIC BLOOD PRESSURE: 120 MMHG

## 2024-09-24 DIAGNOSIS — R60.9 EDEMA, UNSPECIFIED: ICD-10-CM

## 2024-09-24 DIAGNOSIS — R22.41 LOCALIZED SWELLING, MASS AND LUMP, RIGHT LOWER LIMB: ICD-10-CM

## 2024-09-24 PROCEDURE — 99213 OFFICE O/P EST LOW 20 MIN: CPT

## 2024-09-24 NOTE — PHYSICAL EXAM
[Normal] : normal rate, regular rhythm, normal S1 and S2 and no murmur heard [de-identified] : 1+ pitting edema in b/l LE, right >left

## 2024-09-24 NOTE — ASSESSMENT
[FreeTextEntry1] : Right leg with worsening of swelling and pain - will send for ultrasound to rule out DVT b/l chronic lower extremity edema - discussed wearing compression socks and elevating legs will refer to vascular surgery for further evaluation

## 2024-09-24 NOTE — PLAN
[FreeTextEntry1] : Total time on this date and for this encounter was 22 minutes which included: reviewing medical record in preparation to see the patient, performing a medically appropriate examination and/or evaluation, counseling and educating the patient, ordering medications, tests, or procedures, referring to and communicating with other health care professionals about management, and documenting clinical information in the electronic health record. >50% of time spent face to face counseling patient

## 2024-09-24 NOTE — HISTORY OF PRESENT ILLNESS
[de-identified] : 84-year-old male with a past medical history of hypertension, dyslipidemia, chronic renal insufficiency, mild carotid disease who presents today for follow-up. Patient with chronic b/l lower extremity edema. Patient has been having soreness in the right lower leg for the past month. Notes skin changes as well. Has not been wearing compression socks as they are painful

## 2024-09-27 ENCOUNTER — APPOINTMENT (OUTPATIENT)
Dept: ULTRASOUND IMAGING | Facility: CLINIC | Age: 84
End: 2024-09-27

## 2024-09-27 ENCOUNTER — OUTPATIENT (OUTPATIENT)
Dept: OUTPATIENT SERVICES | Facility: HOSPITAL | Age: 84
LOS: 1 days | End: 2024-09-27
Payer: MEDICARE

## 2024-09-27 DIAGNOSIS — Z98.890 OTHER SPECIFIED POSTPROCEDURAL STATES: Chronic | ICD-10-CM

## 2024-09-27 DIAGNOSIS — R22.41 LOCALIZED SWELLING, MASS AND LUMP, RIGHT LOWER LIMB: ICD-10-CM

## 2024-09-27 DIAGNOSIS — Z41.9 ENCOUNTER FOR PROCEDURE FOR PURPOSES OTHER THAN REMEDYING HEALTH STATE, UNSPECIFIED: Chronic | ICD-10-CM

## 2024-09-27 PROCEDURE — 93971 EXTREMITY STUDY: CPT | Mod: 26,RT

## 2024-10-10 ENCOUNTER — APPOINTMENT (OUTPATIENT)
Dept: VASCULAR SURGERY | Facility: CLINIC | Age: 84
End: 2024-10-10
Payer: MEDICARE

## 2024-10-10 VITALS
BODY MASS INDEX: 24.9 KG/M2 | OXYGEN SATURATION: 97 % | HEIGHT: 69.5 IN | SYSTOLIC BLOOD PRESSURE: 109 MMHG | RESPIRATION RATE: 16 BRPM | HEART RATE: 82 BPM | TEMPERATURE: 97.6 F | DIASTOLIC BLOOD PRESSURE: 51 MMHG | WEIGHT: 172 LBS

## 2024-10-10 DIAGNOSIS — I89.0 LYMPHEDEMA, NOT ELSEWHERE CLASSIFIED: ICD-10-CM

## 2024-10-10 DIAGNOSIS — M79.89 OTHER SPECIFIED SOFT TISSUE DISORDERS: ICD-10-CM

## 2024-10-10 PROCEDURE — 93970 EXTREMITY STUDY: CPT

## 2024-10-10 PROCEDURE — 99203 OFFICE O/P NEW LOW 30 MIN: CPT

## 2024-11-19 ENCOUNTER — APPOINTMENT (OUTPATIENT)
Dept: CARDIOLOGY | Facility: CLINIC | Age: 84
End: 2024-11-19
Payer: MEDICARE

## 2024-11-19 ENCOUNTER — NON-APPOINTMENT (OUTPATIENT)
Age: 84
End: 2024-11-19

## 2024-11-19 VITALS
RESPIRATION RATE: 16 BRPM | SYSTOLIC BLOOD PRESSURE: 138 MMHG | BODY MASS INDEX: 26.06 KG/M2 | DIASTOLIC BLOOD PRESSURE: 66 MMHG | HEART RATE: 75 BPM | WEIGHT: 180 LBS | HEIGHT: 69.5 IN

## 2024-11-19 DIAGNOSIS — E78.5 HYPERLIPIDEMIA, UNSPECIFIED: ICD-10-CM

## 2024-11-19 DIAGNOSIS — I89.0 LYMPHEDEMA, NOT ELSEWHERE CLASSIFIED: ICD-10-CM

## 2024-11-19 DIAGNOSIS — I10 ESSENTIAL (PRIMARY) HYPERTENSION: ICD-10-CM

## 2024-11-19 DIAGNOSIS — N18.30 CHRONIC KIDNEY DISEASE, STAGE 3 UNSPECIFIED: ICD-10-CM

## 2024-11-19 PROCEDURE — G2211 COMPLEX E/M VISIT ADD ON: CPT

## 2024-11-19 PROCEDURE — 99214 OFFICE O/P EST MOD 30 MIN: CPT

## 2024-11-19 PROCEDURE — 93000 ELECTROCARDIOGRAM COMPLETE: CPT

## 2024-12-05 ENCOUNTER — APPOINTMENT (OUTPATIENT)
Dept: VASCULAR SURGERY | Facility: CLINIC | Age: 84
End: 2024-12-05

## 2024-12-06 ENCOUNTER — OFFICE (OUTPATIENT)
Dept: URBAN - METROPOLITAN AREA CLINIC 113 | Facility: CLINIC | Age: 84
Setting detail: OPHTHALMOLOGY
End: 2024-12-06
Payer: MEDICARE

## 2024-12-06 DIAGNOSIS — H35.033: ICD-10-CM

## 2024-12-06 DIAGNOSIS — H04.121: ICD-10-CM

## 2024-12-06 DIAGNOSIS — H01.002: ICD-10-CM

## 2024-12-06 DIAGNOSIS — H43.813: ICD-10-CM

## 2024-12-06 DIAGNOSIS — H01.005: ICD-10-CM

## 2024-12-06 DIAGNOSIS — H04.123: ICD-10-CM

## 2024-12-06 DIAGNOSIS — Z96.1: ICD-10-CM

## 2024-12-06 DIAGNOSIS — H04.122: ICD-10-CM

## 2024-12-06 PROCEDURE — 83861 MICROFLUID ANALY TEARS: CPT | Mod: QW,RT | Performed by: OPHTHALMOLOGY

## 2024-12-06 PROCEDURE — 83861 MICROFLUID ANALY TEARS: CPT | Mod: QW,LT | Performed by: OPHTHALMOLOGY

## 2024-12-06 PROCEDURE — 92014 COMPRE OPH EXAM EST PT 1/>: CPT | Performed by: OPHTHALMOLOGY

## 2024-12-06 PROCEDURE — 92250 FUNDUS PHOTOGRAPHY W/I&R: CPT | Performed by: OPHTHALMOLOGY

## 2024-12-06 ASSESSMENT — REFRACTION_MANIFEST
OS_SPHERE: +2.25
OD_AXIS: 068
OS_VA2: 20/20
OS_VA1: 20/30
OD_SPHERE: +0.50
OD_ADD: +1.75
OS_ADD: +3.25
OS_CYLINDER: -1.50
OS_AXIS: 074
OD_ADD: +3.25
OU_VA: 20/20-
OD_VA2: 20/20
OD_SPHERE: +1.75
OD_CYLINDER: -1.00
OS_VA1: 20/20
OD_CYLINDER: -2.00
OD_VA1: 20/25
OD_AXIS: 071
OS_VA1: 20/25
OS_AXIS: 075
OS_CYLINDER: -2.25
OS_AXIS: 097
OS_SPHERE: PLANO
OD_CYLINDER: -1.50
OD_AXIS: 066
OS_ADD: +3.00
OD_VA1: 20/30
OD_VA1: 20/20
OS_CYLINDER: -1.00
OD_ADD: +3.00
OD_SPHERE: +2.00
OS_SPHERE: +2.00
OU_VA: 20/20
OS_ADD: +1.75
OS_CYLINDER: -2.25
OD_VA2: 20/20
OS_VA1: 20/20
OS_AXIS: 065
OD_AXIS: 63
OD_CYLINDER: -1.25
OD_VA1: 20/25
OS_SPHERE: +2.50
OS_VA2: 20/20
OD_SPHERE: +2.00

## 2024-12-06 ASSESSMENT — REFRACTION_AUTOREFRACTION
OD_CYLINDER: -2.75
OS_CYLINDER: -1.50
OS_AXIS: 092
OD_AXIS: 075
OD_SPHERE: +1.25
OS_SPHERE: +0.50

## 2024-12-06 ASSESSMENT — REFRACTION_CURRENTRX
OS_CYLINDER: -1.00
OD_ADD: +2.50
OS_AXIS: 105
OD_AXIS: 070
OS_AXIS: 078
OS_OVR_VA: 20/
OD_SPHERE: +0.50
OD_CYLINDER: -1.50
OD_SPHERE: +2.50
OS_ADD: +2.50
OD_OVR_VA: 20/
OD_CYLINDER: -1.50
OS_VPRISM_DIRECTION: PROGS
OD_VPRISM_DIRECTION: PROGS
OD_OVR_VA: 20/
OS_SPHERE: +0.25
OS_ADD: +3.00
OD_VPRISM_DIRECTION: PROGS
OS_SPHERE: +1.25
OS_OVR_VA: 20/
OS_CYLINDER: -1.00
OD_AXIS: 100
OD_ADD: +3.00
OS_VPRISM_DIRECTION: PROGS

## 2024-12-06 ASSESSMENT — KERATOMETRY
OS_K1POWER_DIOPTERS: 43.25
OD_AXISANGLE_DEGREES: 168
METHOD_AUTO_MANUAL: AUTO
OS_K2POWER_DIOPTERS: 44.00
OD_K2POWER_DIOPTERS: 44.75
OS_AXISANGLE_DEGREES: 003
OD_K1POWER_DIOPTERS: 43.25

## 2024-12-06 ASSESSMENT — CONFRONTATIONAL VISUAL FIELD TEST (CVF)
OD_FINDINGS: FULL
OS_FINDINGS: FULL

## 2024-12-06 ASSESSMENT — SUPERFICIAL PUNCTATE KERATITIS (SPK)
OD_SPK: 1+ 2+
OS_SPK: 1+

## 2024-12-06 ASSESSMENT — TONOMETRY
OD_IOP_MMHG: 18
OS_IOP_MMHG: 16

## 2024-12-06 ASSESSMENT — VISUAL ACUITY
OD_BCVA: 20/30+1
OS_BCVA: 20/30-1

## 2024-12-06 ASSESSMENT — LID EXAM ASSESSMENTS
OD_BLEPHARITIS: RLL 1+
OS_BLEPHARITIS: LLL 1+

## 2025-01-21 ENCOUNTER — RX RENEWAL (OUTPATIENT)
Age: 85
End: 2025-01-21

## 2025-04-11 ENCOUNTER — RX RENEWAL (OUTPATIENT)
Age: 85
End: 2025-04-11

## 2025-04-18 ENCOUNTER — RX RENEWAL (OUTPATIENT)
Age: 85
End: 2025-04-18

## 2025-05-05 ENCOUNTER — NON-APPOINTMENT (OUTPATIENT)
Age: 85
End: 2025-05-05

## 2025-05-07 ENCOUNTER — NON-APPOINTMENT (OUTPATIENT)
Age: 85
End: 2025-05-07

## 2025-05-07 ENCOUNTER — APPOINTMENT (OUTPATIENT)
Dept: CARDIOLOGY | Facility: CLINIC | Age: 85
End: 2025-05-07
Payer: MEDICARE

## 2025-05-07 VITALS
WEIGHT: 167 LBS | BODY MASS INDEX: 24.18 KG/M2 | SYSTOLIC BLOOD PRESSURE: 124 MMHG | HEART RATE: 71 BPM | HEIGHT: 69.5 IN | RESPIRATION RATE: 16 BRPM | DIASTOLIC BLOOD PRESSURE: 66 MMHG

## 2025-05-07 DIAGNOSIS — I49.3 VENTRICULAR PREMATURE DEPOLARIZATION: ICD-10-CM

## 2025-05-07 DIAGNOSIS — I65.29 OCCLUSION AND STENOSIS OF UNSPECIFIED CAROTID ARTERY: ICD-10-CM

## 2025-05-07 PROCEDURE — 99214 OFFICE O/P EST MOD 30 MIN: CPT

## 2025-05-07 PROCEDURE — 93000 ELECTROCARDIOGRAM COMPLETE: CPT

## 2025-05-09 ENCOUNTER — APPOINTMENT (OUTPATIENT)
Dept: INTERNAL MEDICINE | Facility: CLINIC | Age: 85
End: 2025-05-09
Payer: MEDICARE

## 2025-05-09 VITALS
WEIGHT: 167 LBS | HEART RATE: 79 BPM | SYSTOLIC BLOOD PRESSURE: 125 MMHG | DIASTOLIC BLOOD PRESSURE: 70 MMHG | HEIGHT: 69.5 IN | BODY MASS INDEX: 24.18 KG/M2

## 2025-05-09 DIAGNOSIS — R53.83 OTHER FATIGUE: ICD-10-CM

## 2025-05-09 DIAGNOSIS — I10 ESSENTIAL (PRIMARY) HYPERTENSION: ICD-10-CM

## 2025-05-09 DIAGNOSIS — M17.0 BILATERAL PRIMARY OSTEOARTHRITIS OF KNEE: ICD-10-CM

## 2025-05-09 DIAGNOSIS — R79.9 ABNORMAL FINDING OF BLOOD CHEMISTRY, UNSPECIFIED: ICD-10-CM

## 2025-05-09 DIAGNOSIS — D64.9 ANEMIA, UNSPECIFIED: ICD-10-CM

## 2025-05-09 DIAGNOSIS — R73.9 HYPERGLYCEMIA, UNSPECIFIED: ICD-10-CM

## 2025-05-09 DIAGNOSIS — E78.5 HYPERLIPIDEMIA, UNSPECIFIED: ICD-10-CM

## 2025-05-09 DIAGNOSIS — N40.0 BENIGN PROSTATIC HYPERPLASIA WITHOUT LOWER URINARY TRACT SYMPMS: ICD-10-CM

## 2025-05-09 DIAGNOSIS — Z00.00 ENCOUNTER FOR GENERAL ADULT MEDICAL EXAMINATION W/OUT ABNORMAL FINDINGS: ICD-10-CM

## 2025-05-09 DIAGNOSIS — Z72.0 TOBACCO USE: ICD-10-CM

## 2025-05-09 DIAGNOSIS — R49.0 DYSPHONIA: ICD-10-CM

## 2025-05-09 DIAGNOSIS — N18.30 CHRONIC KIDNEY DISEASE, STAGE 3 UNSPECIFIED: ICD-10-CM

## 2025-05-09 PROCEDURE — 36415 COLL VENOUS BLD VENIPUNCTURE: CPT

## 2025-05-09 PROCEDURE — G0439: CPT

## 2025-05-12 LAB
ALBUMIN SERPL ELPH-MCNC: 4.4 G/DL
ALP BLD-CCNC: 74 U/L
ALT SERPL-CCNC: 12 U/L
ANION GAP SERPL CALC-SCNC: 11 MMOL/L
AST SERPL-CCNC: 19 U/L
BASOPHILS # BLD AUTO: 0.03 K/UL
BASOPHILS NFR BLD AUTO: 0.6 %
BILIRUB SERPL-MCNC: 0.5 MG/DL
BUN SERPL-MCNC: 41 MG/DL
CALCIUM SERPL-MCNC: 11.1 MG/DL
CHLORIDE SERPL-SCNC: 106 MMOL/L
CHOLEST SERPL-MCNC: 137 MG/DL
CO2 SERPL-SCNC: 23 MMOL/L
CREAT SERPL-MCNC: 2.47 MG/DL
EGFRCR SERPLBLD CKD-EPI 2021: 25 ML/MIN/1.73M2
EOSINOPHIL # BLD AUTO: 0.07 K/UL
EOSINOPHIL NFR BLD AUTO: 1.4 %
ESTIMATED AVERAGE GLUCOSE: 91 MG/DL
FOLATE SERPL-MCNC: 6.5 NG/ML
GLUCOSE SERPL-MCNC: 91 MG/DL
HBA1C MFR BLD HPLC: 4.8 %
HCT VFR BLD CALC: 34 %
HDLC SERPL-MCNC: 48 MG/DL
HGB BLD-MCNC: 10.7 G/DL
IMM GRANULOCYTES NFR BLD AUTO: 0.6 %
LDLC SERPL-MCNC: 68 MG/DL
LYMPHOCYTES # BLD AUTO: 1.14 K/UL
LYMPHOCYTES NFR BLD AUTO: 23.6 %
MAN DIFF?: NORMAL
MCHC RBC-ENTMCNC: 31.5 G/DL
MCHC RBC-ENTMCNC: 33 PG
MCV RBC AUTO: 104.9 FL
MONOCYTES # BLD AUTO: 0.4 K/UL
MONOCYTES NFR BLD AUTO: 8.3 %
NEUTROPHILS # BLD AUTO: 3.17 K/UL
NEUTROPHILS NFR BLD AUTO: 65.5 %
NONHDLC SERPL-MCNC: 89 MG/DL
PLATELET # BLD AUTO: 179 K/UL
POTASSIUM SERPL-SCNC: 5.2 MMOL/L
PROT SERPL-MCNC: 6.8 G/DL
PSA SERPL-MCNC: 11.8 NG/ML
RBC # BLD: 3.24 M/UL
RBC # FLD: 13 %
SODIUM SERPL-SCNC: 140 MMOL/L
TRIGL SERPL-MCNC: 116 MG/DL
TSH SERPL-ACNC: 0.96 UIU/ML
VIT B12 SERPL-MCNC: 263 PG/ML
WBC # FLD AUTO: 4.84 K/UL

## 2025-05-20 ENCOUNTER — APPOINTMENT (OUTPATIENT)
Dept: OTOLARYNGOLOGY | Facility: CLINIC | Age: 85
End: 2025-05-20

## 2025-05-30 ENCOUNTER — APPOINTMENT (OUTPATIENT)
Dept: OTOLARYNGOLOGY | Facility: CLINIC | Age: 85
End: 2025-05-30

## 2025-05-30 VITALS
HEIGHT: 72 IN | BODY MASS INDEX: 23.03 KG/M2 | DIASTOLIC BLOOD PRESSURE: 55 MMHG | SYSTOLIC BLOOD PRESSURE: 122 MMHG | WEIGHT: 170 LBS | HEART RATE: 79 BPM

## 2025-05-30 DIAGNOSIS — K21.9 GASTRO-ESOPHAGEAL REFLUX DISEASE W/OUT ESOPHAGITIS: ICD-10-CM

## 2025-05-30 PROCEDURE — 99213 OFFICE O/P EST LOW 20 MIN: CPT | Mod: 25

## 2025-05-30 PROCEDURE — 31575 DIAGNOSTIC LARYNGOSCOPY: CPT

## 2025-05-30 RX ORDER — OMEPRAZOLE 40 MG/1
40 CAPSULE, DELAYED RELEASE ORAL
Qty: 30 | Refills: 2 | Status: ACTIVE | COMMUNITY
Start: 2025-05-30 | End: 1900-01-01

## 2025-06-17 ENCOUNTER — OFFICE (OUTPATIENT)
Dept: URBAN - METROPOLITAN AREA CLINIC 63 | Facility: CLINIC | Age: 85
Setting detail: OPHTHALMOLOGY
End: 2025-06-17
Payer: MEDICARE

## 2025-06-17 DIAGNOSIS — Z96.1: ICD-10-CM

## 2025-06-17 DIAGNOSIS — H40.013: ICD-10-CM

## 2025-06-17 DIAGNOSIS — H04.123: ICD-10-CM

## 2025-06-17 DIAGNOSIS — H04.121: ICD-10-CM

## 2025-06-17 DIAGNOSIS — H01.005: ICD-10-CM

## 2025-06-17 DIAGNOSIS — H04.122: ICD-10-CM

## 2025-06-17 DIAGNOSIS — H43.813: ICD-10-CM

## 2025-06-17 DIAGNOSIS — H35.033: ICD-10-CM

## 2025-06-17 DIAGNOSIS — H01.002: ICD-10-CM

## 2025-06-17 PROCEDURE — 92250 FUNDUS PHOTOGRAPHY W/I&R: CPT | Performed by: OPHTHALMOLOGY

## 2025-06-17 PROCEDURE — 92014 COMPRE OPH EXAM EST PT 1/>: CPT | Performed by: OPHTHALMOLOGY

## 2025-06-17 ASSESSMENT — REFRACTION_CURRENTRX
OS_CYLINDER: -1.00
OD_SPHERE: +0.50
OD_VPRISM_DIRECTION: PROGS
OS_AXIS: 078
OD_OVR_VA: 20/
OS_ADD: +3.00
OS_VPRISM_DIRECTION: PROGS
OS_OVR_VA: 20/
OS_SPHERE: +0.25
OD_VPRISM_DIRECTION: PROGS
OS_AXIS: 105
OS_OVR_VA: 20/
OD_SPHERE: +2.50
OD_AXIS: 100
OD_AXIS: 070
OS_VPRISM_DIRECTION: PROGS
OD_OVR_VA: 20/
OS_ADD: +2.50
OD_CYLINDER: -1.50
OD_ADD: +3.00
OD_ADD: +2.50
OS_CYLINDER: -1.00
OD_CYLINDER: -1.50
OS_SPHERE: +1.25

## 2025-06-17 ASSESSMENT — TONOMETRY: OD_IOP_MMHG: 18

## 2025-06-17 ASSESSMENT — REFRACTION_MANIFEST
OD_AXIS: 071
OD_CYLINDER: -1.50
OS_VA2: 20/20
OS_CYLINDER: -2.25
OS_ADD: +1.75
OD_VA1: 20/30
OD_SPHERE: +1.75
OD_VA2: 20/20
OD_VA2: 20/20
OD_CYLINDER: -2.75
OS_SPHERE: +2.25
OS_SPHERE: +2.50
OD_ADD: +3.00
OS_VA2: 20/20
OD_CYLINDER: -1.25
OS_CYLINDER: -1.50
OD_SPHERE: +2.00
OU_VA: 20/20
OD_ADD: +1.75
OS_CYLINDER: -1.50
OD_VA1: 20/30
OD_VA1: 20/25
OD_SPHERE: +2.00
OS_AXIS: 075
OU_VA: 20/20-
OD_ADD: +2.25
OS_CYLINDER: -2.25
OS_VA1: 20/20
OS_VA1: 20/25
OS_SPHERE: +2.00
OS_AXIS: 074
OD_SPHERE: +1.25
OD_AXIS: 075
OS_VA1: 20/20
OS_ADD: +3.00
OD_AXIS: 068
OS_SPHERE: +0.50
OD_AXIS: 066
OD_CYLINDER: -2.00
OS_ADD: +2.25
OD_VA1: 20/20
OS_AXIS: 092
OS_VA1: 20/30
OS_AXIS: 065

## 2025-06-17 ASSESSMENT — LID EXAM ASSESSMENTS
OS_BLEPHARITIS: LLL 1+
OD_BLEPHARITIS: RLL 1+

## 2025-06-17 ASSESSMENT — REFRACTION_AUTOREFRACTION
OD_SPHERE: +1.25
OS_SPHERE: +0.50
OS_CYLINDER: -1.50
OS_AXIS: 092
OD_AXIS: 075
OD_CYLINDER: -2.75

## 2025-06-17 ASSESSMENT — CONFRONTATIONAL VISUAL FIELD TEST (CVF)
OS_FINDINGS: FULL
OD_FINDINGS: FULL

## 2025-06-17 ASSESSMENT — KERATOMETRY
OS_K2POWER_DIOPTERS: 44.00
METHOD_AUTO_MANUAL: AUTO
OD_AXISANGLE_DEGREES: 168
OS_AXISANGLE_DEGREES: 003
OS_K1POWER_DIOPTERS: 43.25
OD_K2POWER_DIOPTERS: 44.75
OD_K1POWER_DIOPTERS: 43.25

## 2025-06-17 ASSESSMENT — SUPERFICIAL PUNCTATE KERATITIS (SPK)
OD_SPK: 1+
OS_SPK: 1+

## 2025-06-17 ASSESSMENT — VISUAL ACUITY
OS_BCVA: 20/30-1
OD_BCVA: 20/25

## 2025-07-07 PROBLEM — M79.673 PAIN OF FOOT, UNSPECIFIED LATERALITY: Status: ACTIVE | Noted: 2025-07-07

## 2025-07-14 ENCOUNTER — RX RENEWAL (OUTPATIENT)
Age: 85
End: 2025-07-14

## 2025-07-18 ENCOUNTER — OFFICE (OUTPATIENT)
Dept: URBAN - METROPOLITAN AREA CLINIC 115 | Facility: CLINIC | Age: 85
Setting detail: OPHTHALMOLOGY
End: 2025-07-18
Payer: MEDICARE

## 2025-07-18 DIAGNOSIS — Z96.1: ICD-10-CM

## 2025-07-18 DIAGNOSIS — H01.005: ICD-10-CM

## 2025-07-18 DIAGNOSIS — H26.491: ICD-10-CM

## 2025-07-18 DIAGNOSIS — H26.493: ICD-10-CM

## 2025-07-18 DIAGNOSIS — H01.002: ICD-10-CM

## 2025-07-18 DIAGNOSIS — H43.813: ICD-10-CM

## 2025-07-18 DIAGNOSIS — H04.121: ICD-10-CM

## 2025-07-18 DIAGNOSIS — H26.492: ICD-10-CM

## 2025-07-18 DIAGNOSIS — H40.013: ICD-10-CM

## 2025-07-18 DIAGNOSIS — H35.033: ICD-10-CM

## 2025-07-18 DIAGNOSIS — H04.122: ICD-10-CM

## 2025-07-18 DIAGNOSIS — H04.123: ICD-10-CM

## 2025-07-18 PROCEDURE — 99214 OFFICE O/P EST MOD 30 MIN: CPT | Performed by: OPHTHALMOLOGY

## 2025-07-18 PROCEDURE — 92083 EXTENDED VISUAL FIELD XM: CPT | Performed by: OPHTHALMOLOGY

## 2025-07-18 PROCEDURE — 92134 CPTRZ OPH DX IMG PST SGM RTA: CPT | Performed by: OPHTHALMOLOGY

## 2025-07-18 ASSESSMENT — REFRACTION_CURRENTRX
OD_VPRISM_DIRECTION: PROGS
OD_ADD: +2.50
OD_SPHERE: +2.50
OS_SPHERE: +1.25
OD_ADD: +3.00
OS_OVR_VA: 20/
OD_AXIS: 070
OS_AXIS: 105
OS_OVR_VA: 20/
OD_VPRISM_DIRECTION: PROGS
OS_CYLINDER: -1.00
OD_OVR_VA: 20/
OS_CYLINDER: -1.00
OS_VPRISM_DIRECTION: PROGS
OS_ADD: +2.50
OS_VPRISM_DIRECTION: PROGS
OD_CYLINDER: -1.50
OD_CYLINDER: -1.50
OD_SPHERE: +0.50
OD_AXIS: 100
OS_AXIS: 078
OS_ADD: +3.00
OS_SPHERE: +0.25
OD_OVR_VA: 20/

## 2025-07-18 ASSESSMENT — REFRACTION_MANIFEST
OS_ADD: +1.75
OS_AXIS: 075
OS_VA1: 20/30
OD_CYLINDER: -1.25
OS_VA1: 20/20
OD_SPHERE: +2.00
OS_SPHERE: +0.50
OD_AXIS: 068
OD_CYLINDER: -1.50
OS_ADD: +2.25
OS_VA1: 20/25
OD_VA1: 20/20
OS_CYLINDER: -2.25
OS_SPHERE: +2.50
OS_AXIS: 074
OS_AXIS: 065
OS_VA1: 20/20
OD_SPHERE: +1.25
OS_SPHERE: +2.00
OD_VA2: 20/20
OS_ADD: +3.00
OU_VA: 20/20-
OS_VA2: 20/20
OD_VA1: 20/30
OD_SPHERE: +2.00
OD_ADD: +1.75
OD_CYLINDER: -2.75
OS_AXIS: 092
OS_SPHERE: +2.25
OD_VA2: 20/20
OD_ADD: +2.25
OD_AXIS: 066
OD_SPHERE: +1.75
OD_AXIS: 071
OD_ADD: +3.00
OS_CYLINDER: -1.50
OS_VA2: 20/20
OU_VA: 20/20
OD_VA1: 20/25
OS_CYLINDER: -2.25
OD_VA1: 20/30
OD_AXIS: 075
OS_CYLINDER: -1.50
OD_CYLINDER: -2.00

## 2025-07-18 ASSESSMENT — LID EXAM ASSESSMENTS
OD_BLEPHARITIS: RLL 1+
OS_BLEPHARITIS: LLL 1+

## 2025-07-18 ASSESSMENT — REFRACTION_AUTOREFRACTION
OD_SPHERE: +1.25
OS_SPHERE: +0.75
OD_AXIS: 080
OS_AXIS: 098
OS_CYLINDER: -1.25
OD_CYLINDER: -2.25

## 2025-07-18 ASSESSMENT — KERATOMETRY
OD_AXISANGLE_DEGREES: 168
METHOD_AUTO_MANUAL: AUTO
OD_K2POWER_DIOPTERS: 44.75
OS_K1POWER_DIOPTERS: 43.25
OS_AXISANGLE_DEGREES: 003
OS_K2POWER_DIOPTERS: 44.00
OD_K1POWER_DIOPTERS: 43.25

## 2025-07-18 ASSESSMENT — CONFRONTATIONAL VISUAL FIELD TEST (CVF)
OD_FINDINGS: FULL
OS_FINDINGS: FULL

## 2025-07-18 ASSESSMENT — TONOMETRY
OD_IOP_MMHG: 13
OS_IOP_MMHG: 16

## 2025-07-18 ASSESSMENT — VISUAL ACUITY
OD_BCVA: 20/25
OS_BCVA: 20/30-1

## 2025-07-18 ASSESSMENT — SUPERFICIAL PUNCTATE KERATITIS (SPK)
OD_SPK: 1+
OS_SPK: 1+

## 2025-08-14 ENCOUNTER — RX RENEWAL (OUTPATIENT)
Age: 85
End: 2025-08-14